# Patient Record
Sex: FEMALE | Race: OTHER | NOT HISPANIC OR LATINO
[De-identification: names, ages, dates, MRNs, and addresses within clinical notes are randomized per-mention and may not be internally consistent; named-entity substitution may affect disease eponyms.]

---

## 2019-07-12 ENCOUNTER — APPOINTMENT (OUTPATIENT)
Dept: NEUROSURGERY | Facility: CLINIC | Age: 64
End: 2019-07-12
Payer: COMMERCIAL

## 2019-07-12 VITALS
BODY MASS INDEX: 33.63 KG/M2 | HEIGHT: 64 IN | RESPIRATION RATE: 16 BRPM | WEIGHT: 197 LBS | SYSTOLIC BLOOD PRESSURE: 106 MMHG | OXYGEN SATURATION: 92 % | DIASTOLIC BLOOD PRESSURE: 76 MMHG | HEART RATE: 103 BPM

## 2019-07-12 PROBLEM — Z00.00 ENCOUNTER FOR PREVENTIVE HEALTH EXAMINATION: Status: ACTIVE | Noted: 2019-07-12

## 2019-07-12 PROCEDURE — 99204 OFFICE O/P NEW MOD 45 MIN: CPT

## 2019-07-22 ENCOUNTER — FORM ENCOUNTER (OUTPATIENT)
Age: 64
End: 2019-07-22

## 2019-07-23 ENCOUNTER — APPOINTMENT (OUTPATIENT)
Dept: CT IMAGING | Facility: HOSPITAL | Age: 64
End: 2019-07-23
Payer: COMMERCIAL

## 2019-07-23 ENCOUNTER — OUTPATIENT (OUTPATIENT)
Dept: OUTPATIENT SERVICES | Facility: HOSPITAL | Age: 64
LOS: 1 days | End: 2019-07-23
Payer: COMMERCIAL

## 2019-07-23 PROCEDURE — 72131 CT LUMBAR SPINE W/O DYE: CPT | Mod: 26

## 2019-07-23 PROCEDURE — 72110 X-RAY EXAM L-2 SPINE 4/>VWS: CPT

## 2019-07-23 PROCEDURE — 72131 CT LUMBAR SPINE W/O DYE: CPT

## 2019-07-23 PROCEDURE — 72110 X-RAY EXAM L-2 SPINE 4/>VWS: CPT | Mod: 26

## 2019-08-30 ENCOUNTER — APPOINTMENT (OUTPATIENT)
Dept: NEUROSURGERY | Facility: CLINIC | Age: 64
End: 2019-08-30
Payer: COMMERCIAL

## 2019-08-30 VITALS
BODY MASS INDEX: 33.63 KG/M2 | SYSTOLIC BLOOD PRESSURE: 122 MMHG | HEART RATE: 104 BPM | WEIGHT: 197 LBS | OXYGEN SATURATION: 94 % | HEIGHT: 64 IN | DIASTOLIC BLOOD PRESSURE: 83 MMHG | RESPIRATION RATE: 18 BRPM

## 2019-08-30 PROCEDURE — 99214 OFFICE O/P EST MOD 30 MIN: CPT

## 2019-08-30 NOTE — RESULTS/DATA
[FreeTextEntry1] : Patient Name: SONIDO WEBB   Report Date: 23-Jul-2019 13:59.00 \par Patient ID: [id number] (00), [id number] (EPI)  Accession No.: [id number] \par Patient Birth Date: 1955  Report Status: F \par Referring Physician: [id number] PRITI HORN   Reason For Study: assess dynamic instability  \par \par \par \par \par \par \par EXAM: CT LUMBAR SPINE \par \par PROCEDURE DATE: 07/23/2019 \par \par \par \par INTERPRETATION: INDICATIONS: Grade II spondylolisthesis. Back pain. \par \par TECHNIQUE: Serial axial images were obtained using multislice helical \par technique through the lumbar spine followed by multiplanar 3D reformations. \par No contrast was given. There are no prior studies. \par \par COMPARISON: none. \par \par FINDINGS: \par \par There is degenerative anterolisthesis of L5 with respect to S1 on the order \par of 8 mm. There are bilateral pars defects at the L5 level consistent with \par spondylolysis. On the coronal reformations, there is curvature in the lumbar \par spine, convex to the left. \par \par Multilevel degenerative osteoarthritis is present. Findings include marginal \par endplate osteophytes, particularly anteriorly, facet arthropathy, and \par ligamentum flavum thickening. Multilevel degenerative disc disease is \par present. Findings include loss of disc space height and endplate sclerosis. \par There is marked discogenic sclerosis at the L5-S1 level as well as vacuum \par phenomenon. \par \par L1-L2 LEVEL: No disc protrusion, foraminal narrowing, or central canal \par stenosis is present. \par L2-L3 LEVEL: Diffuse disc bulge is present. There is mild foraminal \par narrowing bilaterally. No central canal stenosis is present. \par L3-L4 LEVEL: Diffuse disc bulge is present eccentric to the right. There is \par foraminal narrowing, right more than left. No central canal stenosis is \par present. \par L4-L5 LEVEL: Diffuse disc bulge is present and may be a small central disc \par protrusion. There is mild foraminal narrowing bilaterally. There is no \par central canal stenosis. \par  L5-S1 LEVEL: The posterior disc space compartment is unroofed. There is a \par diffuse disc bulge. There is foraminal narrowing bilaterally. The \par anterolisthesis at this level contributes to mild central canal stenosis. \par \par MISCELLANEOUS: Atherosclerotic calcifications are seen within the abdominal \par aorta and iliac arteries bilaterally. There are calcified fibroids noted \par within the uterus. \par \par IMPRESSION: \par \par 1. 8 mm anterolisthesis of L5 with respect to S1 with bilateral pars defects \par at L5 level and mild central canal stenosis related to the anterolisthesis. \par 2. Diffuse disc bulges L2-L3 level through to the L5-S1 level inclusive with \par possible small central disc protrusion L4-L5 level. \par 3. Multilevel foraminal narrowing related to facet arthropathy, as above. \par 4. Mild levocurvature. \par \par \par \par \par \par Thank you for the opportunity to participate in the care of this patient. \par \par \par \par KENDRICK BARRETT M.D., ATTENDING RADIOLOGIST \par This document has been electronically signed. Jul 23 2019 1:59PM \par \par Patient Name: SONIDO WEBB   Report Date: 23-Jul-2019 15:26.00 \par Patient ID: [id number] ([facility]), [id number] (EPI)  Accession No.: [id number] \par Patient Birth Date: 1955  Report Status: F \par Referring Physician: [id number] PRITI HORN   Reason For Study: assess dynamic instability  \par \par \par \par \par \par \par EXAM: XR SPINE-LS-MIN 4 VIEWS \par \par PROCEDURE DATE: 07/23/2019 \par \par \par \par INTERPRETATION: INDICATIONS: L5 pars defects with spondylolisthesis, \par evaluate for dynamic instability \par \par TECHNIQUE: 4 views. AP, lateral, flexion, and extension radiographs of the \par lumbar spine. \par \par COMPARISON EXAMINATION: CT lumbar spine 7/23/2019. \par \par FINDINGS: \par \par ALIGNMENT: There is anterolisthesis of L5 with respect to S1 approximately 9 \par mm which increases to 13 mm on the extension view and on the flexion view \par which suggests translational motion at this level. The pars defects \par bilaterally are better seen on the recent CT scan of the lumbar spine that \par are present. \par \par OSSEOUS STRUCTURES: There are no compression deformities or acute fractures \par of the vertebral bodies. There is facet arthropathy of the lower lumbar \par spine. \par INTERVERTEBRAL DISCS: There is loss of intervertebral disc height, mild at \par the L3-L4 and L4-L5 levels and greatest at the L5-S1. \par SOFT TISSUES: There is no soft tissue edema. \par MISCELLANEOUS: A right hip prosthesis is partially visualized. \par \par IMPRESSION: \par \par 1. Anterolisthesis of L5 and S1 which varies on the flexion, neutral, and \par extension views, as above, indicative of translational motion. \par 2. Bilateral pars defects better delineated on recent CT scan of the lumbar \par spine from 7/23/2019. \par 3. Multilevel degenerative disease and osteoarthritis. \par \par \par \par \par \par Thank you for the opportunity to participate in the care of this patient. \par \par JONNA CASPER M.D., RADIOLOGY RESIDENT \par This document has been electronically signed. \par KENDRICK BARRETT M.D., ATTENDING RADIOLOGIST \par This document has been electronically signed. Jul 23 2019 3:26PM \par \par \par \par  \par \par \par  \par

## 2019-09-05 NOTE — REVIEW OF SYSTEMS
[Abnormal Sensation] : an abnormal sensation [Limping] : limping [As Noted in HPI] : as noted in HPI [Negative] : Respiratory [Abdominal Pain] : no abdominal pain [Diarrhea] : no diarrhea [Vomiting] : no vomiting [Incontinence] : no incontinence [Dysuria] : no dysuria [de-identified] : Pain limiting ambulation

## 2019-09-05 NOTE — ASSESSMENT
[FreeTextEntry1] : Mrs. Villafana suffers from chronic back pain limiting her activities and quality of life.  Imaging demonstrates a grade 2 spondylolisthesis of L5 on S1 with bilateral pars defects at L5.  We recommend an L5/S1 TLIF to decompress the lumbar spine, provide stability and prevent further damage.  After discussion of the risks and benefits of this surgical procedure, she wishes to proceed. \par \par 1)  Schedule L5/S1 TLIF 9/27/19 at [facility]\par Nares swabbed today 8/30/19 at [facility]\par Chlorhexadine wipes w/instructions provided 8/30/19 at [facility]\par PST packet provided\par CARYN Hawkins\par [phone]

## 2019-09-05 NOTE — DATA REVIEWED
[de-identified] : [facility] 7/23/19 Xrays Lumbar A/P Lat, FLEX/EXT - PACS [de-identified] : [facility] 7/23/19 CT Lumbar - PACS

## 2019-09-05 NOTE — PHYSICAL EXAM
[General Appearance - Alert] : alert [General Appearance - In No Acute Distress] : in no acute distress [General Appearance - Well Nourished] : well nourished [General Appearance - Well Developed] : well developed [Oriented To Time, Place, And Person] : oriented to person, place, and time [Impaired Insight] : insight and judgment were intact [Affect] : the affect was normal [Mood] : the mood was normal [Cranial Nerves Optic (II)] : visual acuity intact bilaterally,  pupils equal round and reactive to light [Cranial Nerves Oculomotor (III)] : extraocular motion intact [Cranial Nerves Trigeminal (V)] : facial sensation intact symmetrically [Cranial Nerves Facial (VII)] : face symmetrical [Cranial Nerves Vestibulocochlear (VIII)] : hearing was intact bilaterally [Cranial Nerves Glossopharyngeal (IX)] : tongue and palate midline [Cranial Nerves Accessory (XI - Cranial And Spinal)] : head turning and shoulder shrug symmetric [Cranial Nerves Hypoglossal (XII)] : there was no tongue deviation with protrusion [Motor Strength] : muscle strength was normal in all four extremities [Sensation Tactile Decrease] : light touch was intact [2+] : Patella left 2+ [Normal] : normal [Able to toe walk] : the patient was able to toe walk [Able to heel walk] : the patient was able to heel walk [Intact] : all reflexes within normal limits bilaterally [PERRL With Normal Accommodation] : pupils were equal in size, round, reactive to light, with normal accommodation [Hearing Threshold Finger Rub Not [place]] : hearing was normal [Neck Appearance] : the appearance of the neck was normal [] : no respiratory distress [Respiration, Rhythm And Depth] : normal respiratory rhythm and effort [Full Pulse] : the pedal pulses are present [Edema] : there was no peripheral edema [Abnormal Walk] : normal gait [Involuntary Movements] : no involuntary movements were seen [Motor Tone] : muscle strength and tone were normal [Skin Color & Pigmentation] : normal skin color and pigmentation [Romberg's Sign] : Romberg's sign was negtive [Tremor] : no tremor present [Limited Balance] : balance was intact [Straight-Leg Raise Test - Left] : straight leg raise of the left leg was negative [Straight-Leg Raise Test - Right] : straight leg raise  of the right leg was negative

## 2019-09-05 NOTE — HISTORY OF PRESENT ILLNESS
[FreeTextEntry1] : Mrs. Villafana is a 63 yo woman with PMH of smoking (40 pack yrs), R THR, depression, back pain with onset of 7 years, and perineal pain.  She arrives for imaging review and discussion r/b pain management Dr. Dowell.  \par \par Pain is midline Low back \par now rated 5/10, range 4/10 to 8/10\par Aggravated with standing more than 10 minutes, ambulating a few blocks\par Relieved with supine, or sitting in recliner\par \par Perineal pain is severe and most bothersome\par now rated 7/10, range 4/10 to 10/10\par Pain is constant\par No relief\par \par Pain management has included nerve blocks (perineal) with only 1 week relief\par Trigger point injections to Lumbar area with no relief\par \par PCP:  Parker Hawkins\par [phone]

## 2019-09-06 ENCOUNTER — OUTPATIENT (OUTPATIENT)
Dept: OUTPATIENT SERVICES | Facility: HOSPITAL | Age: 64
LOS: 1 days | End: 2019-09-06
Payer: COMMERCIAL

## 2019-09-06 DIAGNOSIS — Z22.321 CARRIER OR SUSPECTED CARRIER OF METHICILLIN SUSCEPTIBLE STAPHYLOCOCCUS AUREUS: ICD-10-CM

## 2019-09-06 DIAGNOSIS — M54.5 LOW BACK PAIN: ICD-10-CM

## 2019-09-06 PROCEDURE — 87641 MR-STAPH DNA AMP PROBE: CPT

## 2019-09-07 LAB
MRSA PCR RESULT.: SIGNIFICANT CHANGE UP
S AUREUS DNA NOSE QL NAA+PROBE: SIGNIFICANT CHANGE UP

## 2019-09-26 RX ORDER — INFLUENZA VIRUS VACCINE 15; 15; 15; 15 UG/.5ML; UG/.5ML; UG/.5ML; UG/.5ML
0.5 SUSPENSION INTRAMUSCULAR ONCE
Refills: 0 | Status: DISCONTINUED | OUTPATIENT
Start: 2019-09-27 | End: 2019-10-01

## 2019-09-27 ENCOUNTER — INPATIENT (INPATIENT)
Facility: HOSPITAL | Age: 64
LOS: 3 days | Discharge: HOME | DRG: 454 | End: 2019-10-01
Attending: STUDENT IN AN ORGANIZED HEALTH CARE EDUCATION/TRAINING PROGRAM | Admitting: STUDENT IN AN ORGANIZED HEALTH CARE EDUCATION/TRAINING PROGRAM
Payer: COMMERCIAL

## 2019-09-27 ENCOUNTER — APPOINTMENT (OUTPATIENT)
Dept: NEUROSURGERY | Facility: HOSPITAL | Age: 64
End: 2019-09-27

## 2019-09-27 VITALS
WEIGHT: 208.34 LBS | DIASTOLIC BLOOD PRESSURE: 77 MMHG | TEMPERATURE: 98 F | HEART RATE: 82 BPM | RESPIRATION RATE: 18 BRPM | HEIGHT: 64 IN | OXYGEN SATURATION: 95 % | SYSTOLIC BLOOD PRESSURE: 114 MMHG

## 2019-09-27 DIAGNOSIS — Z41.9 ENCOUNTER FOR PROCEDURE FOR PURPOSES OTHER THAN REMEDYING HEALTH STATE, UNSPECIFIED: Chronic | ICD-10-CM

## 2019-09-27 DIAGNOSIS — F17.210 NICOTINE DEPENDENCE, CIGARETTES, UNCOMPLICATED: ICD-10-CM

## 2019-09-27 DIAGNOSIS — R06.03 ACUTE RESPIRATORY DISTRESS: ICD-10-CM

## 2019-09-27 DIAGNOSIS — M54.17 RADICULOPATHY, LUMBOSACRAL REGION: ICD-10-CM

## 2019-09-27 DIAGNOSIS — Z96.641 PRESENCE OF RIGHT ARTIFICIAL HIP JOINT: ICD-10-CM

## 2019-09-27 DIAGNOSIS — Z96.641 PRESENCE OF RIGHT ARTIFICIAL HIP JOINT: Chronic | ICD-10-CM

## 2019-09-27 DIAGNOSIS — J44.9 CHRONIC OBSTRUCTIVE PULMONARY DISEASE, UNSPECIFIED: ICD-10-CM

## 2019-09-27 DIAGNOSIS — M48.07 SPINAL STENOSIS, LUMBOSACRAL REGION: ICD-10-CM

## 2019-09-27 DIAGNOSIS — M54.9 DORSALGIA, UNSPECIFIED: ICD-10-CM

## 2019-09-27 DIAGNOSIS — G89.29 OTHER CHRONIC PAIN: ICD-10-CM

## 2019-09-27 DIAGNOSIS — M43.17 SPONDYLOLISTHESIS, LUMBOSACRAL REGION: ICD-10-CM

## 2019-09-27 DIAGNOSIS — J98.11 ATELECTASIS: ICD-10-CM

## 2019-09-27 DIAGNOSIS — F32.9 MAJOR DEPRESSIVE DISORDER, SINGLE EPISODE, UNSPECIFIED: ICD-10-CM

## 2019-09-27 LAB
ANION GAP SERPL CALC-SCNC: 14 MMOL/L — SIGNIFICANT CHANGE UP (ref 5–17)
BASE EXCESS BLDA CALC-SCNC: -2.9 MMOL/L — LOW (ref -2–3)
BASOPHILS # BLD AUTO: 0.05 K/UL — SIGNIFICANT CHANGE UP (ref 0–0.2)
BASOPHILS NFR BLD AUTO: 0.3 % — SIGNIFICANT CHANGE UP (ref 0–2)
BUN SERPL-MCNC: 8 MG/DL — SIGNIFICANT CHANGE UP (ref 7–23)
CA-I BLDA-SCNC: 1.06 MMOL/L — LOW (ref 1.12–1.3)
CALCIUM SERPL-MCNC: 8.4 MG/DL — SIGNIFICANT CHANGE UP (ref 8.4–10.5)
CHLORIDE SERPL-SCNC: 104 MMOL/L — SIGNIFICANT CHANGE UP (ref 96–108)
CO2 SERPL-SCNC: 23 MMOL/L — SIGNIFICANT CHANGE UP (ref 22–31)
COHGB MFR BLDA: 1.5 % — SIGNIFICANT CHANGE UP
CREAT SERPL-MCNC: 0.66 MG/DL — SIGNIFICANT CHANGE UP (ref 0.5–1.3)
EOSINOPHIL # BLD AUTO: 0.01 K/UL — SIGNIFICANT CHANGE UP (ref 0–0.5)
EOSINOPHIL NFR BLD AUTO: 0.1 % — SIGNIFICANT CHANGE UP (ref 0–6)
GLUCOSE BLDC GLUCOMTR-MCNC: 125 MG/DL — HIGH (ref 70–99)
GLUCOSE SERPL-MCNC: 173 MG/DL — HIGH (ref 70–99)
HCO3 BLDA-SCNC: 24 MMOL/L — SIGNIFICANT CHANGE UP (ref 21–28)
HCT VFR BLD CALC: 38.1 % — SIGNIFICANT CHANGE UP (ref 34.5–45)
HGB BLD-MCNC: 12 G/DL — SIGNIFICANT CHANGE UP (ref 11.5–15.5)
HGB BLDA-MCNC: 13 G/DL — SIGNIFICANT CHANGE UP (ref 11.5–15.5)
IMM GRANULOCYTES NFR BLD AUTO: 0.9 % — SIGNIFICANT CHANGE UP (ref 0–1.5)
LYMPHOCYTES # BLD AUTO: 0.72 K/UL — LOW (ref 1–3.3)
LYMPHOCYTES # BLD AUTO: 4.8 % — LOW (ref 13–44)
MCHC RBC-ENTMCNC: 28.2 PG — SIGNIFICANT CHANGE UP (ref 27–34)
MCHC RBC-ENTMCNC: 31.5 GM/DL — LOW (ref 32–36)
MCV RBC AUTO: 89.4 FL — SIGNIFICANT CHANGE UP (ref 80–100)
METHGB MFR BLDA: 0.7 % — SIGNIFICANT CHANGE UP
MONOCYTES # BLD AUTO: 0.21 K/UL — SIGNIFICANT CHANGE UP (ref 0–0.9)
MONOCYTES NFR BLD AUTO: 1.4 % — LOW (ref 2–14)
NEUTROPHILS # BLD AUTO: 13.97 K/UL — HIGH (ref 1.8–7.4)
NEUTROPHILS NFR BLD AUTO: 92.5 % — HIGH (ref 43–77)
NRBC # BLD: 0 /100 WBCS — SIGNIFICANT CHANGE UP (ref 0–0)
O2 CT VFR BLDA CALC: 18.7 ML/DL — SIGNIFICANT CHANGE UP (ref 15–23)
OXYHGB MFR BLDA: 97 % — SIGNIFICANT CHANGE UP (ref 94–100)
PCO2 BLDA: 47 MMHG — HIGH (ref 32–45)
PH BLDA: 7.32 — LOW (ref 7.35–7.45)
PLATELET # BLD AUTO: 251 K/UL — SIGNIFICANT CHANGE UP (ref 150–400)
PO2 BLDA: 358 MMHG — HIGH (ref 83–108)
POTASSIUM BLDA-SCNC: 4 MMOL/L — SIGNIFICANT CHANGE UP (ref 3.5–4.9)
POTASSIUM SERPL-MCNC: 4.5 MMOL/L — SIGNIFICANT CHANGE UP (ref 3.5–5.3)
POTASSIUM SERPL-SCNC: 4.5 MMOL/L — SIGNIFICANT CHANGE UP (ref 3.5–5.3)
RBC # BLD: 4.26 M/UL — SIGNIFICANT CHANGE UP (ref 3.8–5.2)
RBC # FLD: 14.7 % — HIGH (ref 10.3–14.5)
SAO2 % BLDA: 100 % — SIGNIFICANT CHANGE UP (ref 95–100)
SODIUM BLDA-SCNC: 138 MMOL/L — SIGNIFICANT CHANGE UP (ref 138–146)
SODIUM SERPL-SCNC: 141 MMOL/L — SIGNIFICANT CHANGE UP (ref 135–145)
WBC # BLD: 15.1 K/UL — HIGH (ref 3.8–10.5)
WBC # FLD AUTO: 15.1 K/UL — HIGH (ref 3.8–10.5)

## 2019-09-27 PROCEDURE — 63048 LAM FACETEC &FORAMOT EA ADDL: CPT | Mod: 62,59

## 2019-09-27 PROCEDURE — 22633 ARTHRD CMBN 1NTRSPC LUMBAR: CPT | Mod: 62

## 2019-09-27 PROCEDURE — 61783 SCAN PROC SPINAL: CPT

## 2019-09-27 PROCEDURE — 22853 INSJ BIOMECHANICAL DEVICE: CPT

## 2019-09-27 PROCEDURE — 63047 LAM FACETEC & FORAMOT LUMBAR: CPT | Mod: 62,59

## 2019-09-27 PROCEDURE — 61783 SCAN PROC SPINAL: CPT | Mod: 80,59

## 2019-09-27 PROCEDURE — 22840 INSERT SPINE FIXATION DEVICE: CPT

## 2019-09-27 PROCEDURE — 22840 INSERT SPINE FIXATION DEVICE: CPT | Mod: 80

## 2019-09-27 PROCEDURE — 22853 INSJ BIOMECHANICAL DEVICE: CPT | Mod: 80

## 2019-09-27 RX ORDER — ONDANSETRON 8 MG/1
4 TABLET, FILM COATED ORAL EVERY 6 HOURS
Refills: 0 | Status: DISCONTINUED | OUTPATIENT
Start: 2019-09-27 | End: 2019-09-27

## 2019-09-27 RX ORDER — HYDROMORPHONE HYDROCHLORIDE 2 MG/ML
30 INJECTION INTRAMUSCULAR; INTRAVENOUS; SUBCUTANEOUS
Refills: 0 | Status: DISCONTINUED | OUTPATIENT
Start: 2019-09-27 | End: 2019-09-28

## 2019-09-27 RX ORDER — POVIDONE-IODINE 5 %
1 AEROSOL (ML) TOPICAL ONCE
Refills: 0 | Status: DISCONTINUED | OUTPATIENT
Start: 2019-09-27 | End: 2019-09-27

## 2019-09-27 RX ORDER — CEFAZOLIN SODIUM 1 G
2000 VIAL (EA) INJECTION EVERY 8 HOURS
Refills: 0 | Status: COMPLETED | OUTPATIENT
Start: 2019-09-27 | End: 2019-09-28

## 2019-09-27 RX ORDER — BUPIVACAINE 13.3 MG/ML
20 INJECTION, SUSPENSION, LIPOSOMAL INFILTRATION ONCE
Refills: 0 | Status: DISCONTINUED | OUTPATIENT
Start: 2019-09-27 | End: 2019-10-01

## 2019-09-27 RX ORDER — DIAZEPAM 5 MG
5 TABLET ORAL EVERY 8 HOURS
Refills: 0 | Status: DISCONTINUED | OUTPATIENT
Start: 2019-09-27 | End: 2019-10-01

## 2019-09-27 RX ORDER — CEFAZOLIN SODIUM 1 G
2000 VIAL (EA) INJECTION EVERY 8 HOURS
Refills: 0 | Status: DISCONTINUED | OUTPATIENT
Start: 2019-09-27 | End: 2019-09-27

## 2019-09-27 RX ORDER — SENNA PLUS 8.6 MG/1
2 TABLET ORAL AT BEDTIME
Refills: 0 | Status: DISCONTINUED | OUTPATIENT
Start: 2019-09-27 | End: 2019-10-01

## 2019-09-27 RX ORDER — GABAPENTIN 400 MG/1
400 CAPSULE ORAL
Refills: 0 | Status: DISCONTINUED | OUTPATIENT
Start: 2019-09-27 | End: 2019-10-01

## 2019-09-27 RX ORDER — SODIUM CHLORIDE 9 MG/ML
1000 INJECTION, SOLUTION INTRAVENOUS
Refills: 0 | Status: DISCONTINUED | OUTPATIENT
Start: 2019-09-27 | End: 2019-09-28

## 2019-09-27 RX ORDER — DOCUSATE SODIUM 100 MG
100 CAPSULE ORAL THREE TIMES A DAY
Refills: 0 | Status: DISCONTINUED | OUTPATIENT
Start: 2019-09-27 | End: 2019-10-01

## 2019-09-27 RX ORDER — ONDANSETRON 8 MG/1
4 TABLET, FILM COATED ORAL EVERY 6 HOURS
Refills: 0 | Status: DISCONTINUED | OUTPATIENT
Start: 2019-09-27 | End: 2019-10-01

## 2019-09-27 RX ORDER — GABAPENTIN 400 MG/1
0 CAPSULE ORAL
Qty: 0 | Refills: 0 | DISCHARGE

## 2019-09-27 RX ORDER — NALOXONE HYDROCHLORIDE 4 MG/.1ML
0.1 SPRAY NASAL
Refills: 0 | Status: DISCONTINUED | OUTPATIENT
Start: 2019-09-27 | End: 2019-10-01

## 2019-09-27 RX ORDER — NICOTINE POLACRILEX 2 MG
1 GUM BUCCAL DAILY
Refills: 0 | Status: DISCONTINUED | OUTPATIENT
Start: 2019-09-27 | End: 2019-10-01

## 2019-09-27 RX ORDER — DEXAMETHASONE 0.5 MG/5ML
4 ELIXIR ORAL EVERY 6 HOURS
Refills: 0 | Status: COMPLETED | OUTPATIENT
Start: 2019-09-27 | End: 2019-09-28

## 2019-09-27 RX ORDER — HYDROMORPHONE HYDROCHLORIDE 2 MG/ML
0.5 INJECTION INTRAMUSCULAR; INTRAVENOUS; SUBCUTANEOUS
Refills: 0 | Status: DISCONTINUED | OUTPATIENT
Start: 2019-09-27 | End: 2019-09-28

## 2019-09-27 RX ORDER — ACETAMINOPHEN 500 MG
1000 TABLET ORAL ONCE
Refills: 0 | Status: COMPLETED | OUTPATIENT
Start: 2019-09-27 | End: 2019-09-27

## 2019-09-27 RX ADMIN — Medication 2000 MILLIGRAM(S): at 21:12

## 2019-09-27 RX ADMIN — HYDROMORPHONE HYDROCHLORIDE 0.5 MILLIGRAM(S): 2 INJECTION INTRAMUSCULAR; INTRAVENOUS; SUBCUTANEOUS at 15:34

## 2019-09-27 RX ADMIN — Medication 4 MILLIGRAM(S): at 17:52

## 2019-09-27 RX ADMIN — HYDROMORPHONE HYDROCHLORIDE 0.5 MILLIGRAM(S): 2 INJECTION INTRAMUSCULAR; INTRAVENOUS; SUBCUTANEOUS at 16:08

## 2019-09-27 RX ADMIN — HYDROMORPHONE HYDROCHLORIDE 30 MILLILITER(S): 2 INJECTION INTRAMUSCULAR; INTRAVENOUS; SUBCUTANEOUS at 16:38

## 2019-09-27 RX ADMIN — SENNA PLUS 2 TABLET(S): 8.6 TABLET ORAL at 22:24

## 2019-09-27 RX ADMIN — Medication 400 MILLIGRAM(S): at 22:24

## 2019-09-27 RX ADMIN — Medication 1000 MILLIGRAM(S): at 22:39

## 2019-09-27 RX ADMIN — Medication 100 MILLIGRAM(S): at 22:24

## 2019-09-27 RX ADMIN — GABAPENTIN 400 MILLIGRAM(S): 400 CAPSULE ORAL at 17:52

## 2019-09-27 RX ADMIN — Medication 5 MILLIGRAM(S): at 16:38

## 2019-09-27 NOTE — CHART NOTE - NSCHARTNOTEFT_GEN_A_CORE
Neurosurgical Indications for Screening Dopplers on Admission:       1) Known hypercoagulation disorder (h/o VTE, thrombophilia, HIT, etc.)   2) Admitted from prolonged stay from another institution (straight forward ER transfers not included)  3) Presenting with significant leg immobility   4) Presenting with signs and symptoms of VTE?    5) With significant critical illness, Including "found down" for unknown period of time in HPI  6) With significant neurotrauma (TBI, SCI / TLS spine fractures)   7) Who are comatose   8) With known malignancy (e.g. glioblastoma multiforme, meningioma, etc.). Excludes IA chemo 23hr observation stays  9) On hemodialysis   10) Who have received platelet transfusion or antithrombotic reversal agents recently   11) Who have had recent major orthopedic surgery        none of the above apply     Screening dopplers indicated?   Y _   N _  no   DVT Prophylaxis:  _ x_ SCD's   _ chemoprophylaxis postop day 1

## 2019-09-27 NOTE — H&P PST ADULT - HISTORY OF PRESENT ILLNESS
Mrs. Villafana is a 65 yo woman with PMH of smoking (40 pack yrs), R THR, depression, back pain with onset of 7 years, and perineal pain. She arrives for imaging review and discussion r/b pain management Dr. Dowell.     Pain is midline Low back   now rated 5/10, range 4/10 to 8/10  Aggravated with standing more than 10 minutes, ambulating a few blocks  Relieved with supine, or sitting in recliner    Perineal pain is severe and most bothersome  now rated 7/10, range 4/10 to 10/10  Pain is constant  No relief    Pain management has included nerve blocks (perineal) with only 1 week relief  Trigger point injections to Lumbar area with no relief    PCP: Parker Hawkins [phone]     Physical Exam  Lumbar/sacral spine examination demonstrates. straight leg raise of the left leg was negative. straight leg raise of the right leg was negative.   Gait: normal the patient was able to toe walk. the patient was able to heel walk.   Motor Exam: all motor groups within normal limits of strength and tone bilaterally.   Sensory Exam: all sensory within normal limits bilaterally.   Deep Tendon Reflexes: all reflexes within normal limits bilaterally.

## 2019-09-27 NOTE — PACU DISCHARGE NOTE - COMMENTS
pt aao x3.  VSS.  lower back aquacell dsg c/d/i; hemovac to right back and left back to ss; telfa dsg to right lower back c/d/i.  pt denies c/o pain.  report given to RN on 8 wollman.  pt to go to [facility] via bed with transport and RN

## 2019-09-28 DIAGNOSIS — J96.90 RESPIRATORY FAILURE, UNSPECIFIED, UNSPECIFIED WHETHER WITH HYPOXIA OR HYPERCAPNIA: ICD-10-CM

## 2019-09-28 DIAGNOSIS — J98.11 ATELECTASIS: ICD-10-CM

## 2019-09-28 DIAGNOSIS — F17.200 NICOTINE DEPENDENCE, UNSPECIFIED, UNCOMPLICATED: ICD-10-CM

## 2019-09-28 LAB
ANION GAP SERPL CALC-SCNC: 8 MMOL/L — SIGNIFICANT CHANGE UP (ref 5–17)
BASOPHILS # BLD AUTO: 0.02 K/UL — SIGNIFICANT CHANGE UP (ref 0–0.2)
BASOPHILS NFR BLD AUTO: 0.1 % — SIGNIFICANT CHANGE UP (ref 0–2)
BUN SERPL-MCNC: 10 MG/DL — SIGNIFICANT CHANGE UP (ref 7–23)
CALCIUM SERPL-MCNC: 9.2 MG/DL — SIGNIFICANT CHANGE UP (ref 8.4–10.5)
CHLORIDE SERPL-SCNC: 101 MMOL/L — SIGNIFICANT CHANGE UP (ref 96–108)
CO2 SERPL-SCNC: 31 MMOL/L — SIGNIFICANT CHANGE UP (ref 22–31)
CREAT SERPL-MCNC: 0.65 MG/DL — SIGNIFICANT CHANGE UP (ref 0.5–1.3)
EOSINOPHIL # BLD AUTO: 0 K/UL — SIGNIFICANT CHANGE UP (ref 0–0.5)
EOSINOPHIL NFR BLD AUTO: 0 % — SIGNIFICANT CHANGE UP (ref 0–6)
GLUCOSE SERPL-MCNC: 147 MG/DL — HIGH (ref 70–99)
HCT VFR BLD CALC: 36.3 % — SIGNIFICANT CHANGE UP (ref 34.5–45)
HGB BLD-MCNC: 11.8 G/DL — SIGNIFICANT CHANGE UP (ref 11.5–15.5)
IMM GRANULOCYTES NFR BLD AUTO: 0.7 % — SIGNIFICANT CHANGE UP (ref 0–1.5)
LYMPHOCYTES # BLD AUTO: 0.95 K/UL — LOW (ref 1–3.3)
LYMPHOCYTES # BLD AUTO: 6.8 % — LOW (ref 13–44)
MAGNESIUM SERPL-MCNC: 2 MG/DL — SIGNIFICANT CHANGE UP (ref 1.6–2.6)
MCHC RBC-ENTMCNC: 28.7 PG — SIGNIFICANT CHANGE UP (ref 27–34)
MCHC RBC-ENTMCNC: 32.5 GM/DL — SIGNIFICANT CHANGE UP (ref 32–36)
MCV RBC AUTO: 88.3 FL — SIGNIFICANT CHANGE UP (ref 80–100)
MONOCYTES # BLD AUTO: 0.48 K/UL — SIGNIFICANT CHANGE UP (ref 0–0.9)
MONOCYTES NFR BLD AUTO: 3.4 % — SIGNIFICANT CHANGE UP (ref 2–14)
NEUTROPHILS # BLD AUTO: 12.38 K/UL — HIGH (ref 1.8–7.4)
NEUTROPHILS NFR BLD AUTO: 89 % — HIGH (ref 43–77)
NRBC # BLD: 0 /100 WBCS — SIGNIFICANT CHANGE UP (ref 0–0)
PHOSPHATE SERPL-MCNC: 3.7 MG/DL — SIGNIFICANT CHANGE UP (ref 2.5–4.5)
PLATELET # BLD AUTO: 246 K/UL — SIGNIFICANT CHANGE UP (ref 150–400)
POTASSIUM SERPL-MCNC: 4.6 MMOL/L — SIGNIFICANT CHANGE UP (ref 3.5–5.3)
POTASSIUM SERPL-SCNC: 4.6 MMOL/L — SIGNIFICANT CHANGE UP (ref 3.5–5.3)
RBC # BLD: 4.11 M/UL — SIGNIFICANT CHANGE UP (ref 3.8–5.2)
RBC # FLD: 14.8 % — HIGH (ref 10.3–14.5)
SODIUM SERPL-SCNC: 140 MMOL/L — SIGNIFICANT CHANGE UP (ref 135–145)
WBC # BLD: 13.93 K/UL — HIGH (ref 3.8–10.5)
WBC # FLD AUTO: 13.93 K/UL — HIGH (ref 3.8–10.5)

## 2019-09-28 PROCEDURE — 99221 1ST HOSP IP/OBS SF/LOW 40: CPT

## 2019-09-28 PROCEDURE — 71045 X-RAY EXAM CHEST 1 VIEW: CPT | Mod: 26

## 2019-09-28 PROCEDURE — 99223 1ST HOSP IP/OBS HIGH 75: CPT | Mod: GC

## 2019-09-28 PROCEDURE — 93010 ELECTROCARDIOGRAM REPORT: CPT

## 2019-09-28 RX ORDER — OXYCODONE AND ACETAMINOPHEN 5; 325 MG/1; MG/1
2 TABLET ORAL EVERY 4 HOURS
Refills: 0 | Status: DISCONTINUED | OUTPATIENT
Start: 2019-09-28 | End: 2019-10-01

## 2019-09-28 RX ORDER — PANTOPRAZOLE SODIUM 20 MG/1
40 TABLET, DELAYED RELEASE ORAL ONCE
Refills: 0 | Status: COMPLETED | OUTPATIENT
Start: 2019-09-28 | End: 2019-09-28

## 2019-09-28 RX ORDER — OXYCODONE AND ACETAMINOPHEN 5; 325 MG/1; MG/1
1 TABLET ORAL EVERY 4 HOURS
Refills: 0 | Status: DISCONTINUED | OUTPATIENT
Start: 2019-09-28 | End: 2019-10-01

## 2019-09-28 RX ORDER — ENOXAPARIN SODIUM 100 MG/ML
40 INJECTION SUBCUTANEOUS EVERY 24 HOURS
Refills: 0 | Status: DISCONTINUED | OUTPATIENT
Start: 2019-09-28 | End: 2019-10-01

## 2019-09-28 RX ADMIN — OXYCODONE AND ACETAMINOPHEN 2 TABLET(S): 5; 325 TABLET ORAL at 18:40

## 2019-09-28 RX ADMIN — Medication 100 MILLIGRAM(S): at 21:49

## 2019-09-28 RX ADMIN — GABAPENTIN 400 MILLIGRAM(S): 400 CAPSULE ORAL at 05:15

## 2019-09-28 RX ADMIN — GABAPENTIN 400 MILLIGRAM(S): 400 CAPSULE ORAL at 19:23

## 2019-09-28 RX ADMIN — Medication 4 MILLIGRAM(S): at 05:15

## 2019-09-28 RX ADMIN — Medication 5 MILLIGRAM(S): at 19:23

## 2019-09-28 RX ADMIN — Medication 4 MILLIGRAM(S): at 00:13

## 2019-09-28 RX ADMIN — OXYCODONE AND ACETAMINOPHEN 1 TABLET(S): 5; 325 TABLET ORAL at 22:56

## 2019-09-28 RX ADMIN — OXYCODONE AND ACETAMINOPHEN 2 TABLET(S): 5; 325 TABLET ORAL at 19:03

## 2019-09-28 RX ADMIN — ENOXAPARIN SODIUM 40 MILLIGRAM(S): 100 INJECTION SUBCUTANEOUS at 21:49

## 2019-09-28 RX ADMIN — OXYCODONE AND ACETAMINOPHEN 1 TABLET(S): 5; 325 TABLET ORAL at 21:56

## 2019-09-28 RX ADMIN — Medication 100 MILLIGRAM(S): at 05:15

## 2019-09-28 RX ADMIN — Medication 5 MILLIGRAM(S): at 07:35

## 2019-09-28 RX ADMIN — SENNA PLUS 2 TABLET(S): 8.6 TABLET ORAL at 21:49

## 2019-09-28 RX ADMIN — Medication 4 MILLIGRAM(S): at 12:04

## 2019-09-28 RX ADMIN — Medication 5 MILLIGRAM(S): at 00:13

## 2019-09-28 RX ADMIN — Medication 2000 MILLIGRAM(S): at 05:15

## 2019-09-28 NOTE — CONSULT NOTE ADULT - ATTENDING COMMENTS
Post operative hypoxemia most likely secondary to atelectasis. Current smoker so may have undiagnosed COPD but not in exacerbation at this time. Low suspicion for PE. Incentive spirometry and ambulation. Should get outpatient follow up for evaluation of possible COPD. Please call with questions.

## 2019-09-28 NOTE — CONSULT NOTE ADULT - ASSESSMENT
This is a 64 y.o female w/ PMH R THR, back pain for 7 years that failed conservative management, smoking for 40 years ( 1.p ppd, now cut down to 6-7 cigs daily) who is admitted for Rt L5-S1 TLIF (transforaminal lumbar interbody fusion) for spondylolisthesis. Medicine consulted for hypoxia to low 80's on RA.     - O2 sat on RA today was 86% which improved on 6L NC to 95%  - OP CXR 1 month ago with bibasilar atelectasis. Repeat CXR today with atelectasis but no infiltrates or consolidation.   - Believe patient has COPD given hx of smoking, cough and sputum with a component of atelectasis leading to hypoxia while hospitalized. Pt is currently not in any exacerbation and does not require any steroids or abx.   - Recommend OOTBC and ICS 10 times every hour  - Leukocytosis unlikely from PNA as cough is unchanged and patient with no fevers. Recommend RVP  - Wean off Os as tolerated  - Need OP Pulmonology appointment for PFT's This is a 64 y.o female w/ PMH R THR, back pain for 7 years that failed conservative management, smoking for 40 years ( 1.p ppd, now cut down to 6-7 cigs daily) who is admitted for Rt L5-S1 TLIF (transforaminal lumbar interbody fusion) for spondylolisthesis. Medicine consulted for hypoxia to low 80's on RA.     - O2 sat on RA today was 86% which improved on 6L NC to 95%. Please get ABG on Room Air   - OP CXR 1 month ago with bibasilar atelectasis. Repeat CXR today with atelectasis but no infiltrates or consolidation.   - Believe patient has COPD given hx of smoking, cough and sputum with a component of atelectasis leading to hypoxia while hospitalized. Pt is currently not in any exacerbation and does not require any steroids or abx.   - Recommend OOTBC and ICS 10 times every hour  - Leukocytosis unlikely from PNA as cough is unchanged and patient with no fevers. Recommend RVP  - Please obtain EKG   - Wean off Os as tolerated  - Recommend Pulm consult This is a 64 y.o female w/ PMH R THR, back pain for 7 years that failed conservative management, smoking for 40 years ( 1.p ppd, now cut down to 6-7 cigs daily) who is admitted for Rt L5-S1 TLIF (transforaminal lumbar interbody fusion) for spondylolisthesis. Medicine consulted for hypoxia to low 80's on RA.     - O2 sat on RA today was 86% which improved on 6L NC to 95%. Please get ABG on Room Air and consider CTA to r/o PE  - OP CXR 1 month ago with bibasilar atelectasis. Repeat CXR today with atelectasis but no infiltrates or consolidation.   - Believe patient has COPD given hx of smoking, cough and sputum with a component of atelectasis leading to hypoxia while hospitalized. Pt is currently not in any exacerbation and does not require any steroids or abx.   - Recommend OOTBC and ICS 10 times every hour  - Leukocytosis unlikely from PNA as cough is unchanged and patient with no fevers. Recommend RVP  - Please obtain EKG   - Wean off Os as tolerated  - Recommend Pulm consult

## 2019-09-28 NOTE — PHYSICAL THERAPY INITIAL EVALUATION ADULT - PERTINENT HX OF CURRENT PROBLEM, REHAB EVAL
Patient's history includes PMH of smoking (40 pack yrs), R THR, depression, back pain with onset of 7 years, and perineal pain. Pain management Dr. Dowell. Pain management has included nerve blocks (perineal) with only 1 week relief. Trigger point injections to Lumbar area with no relief. Now s/p POD# 1 S/P Rt L5-S1 TLIF for spondylolisthesis with back pain

## 2019-09-28 NOTE — CONSULT NOTE ADULT - ATTENDING COMMENTS
Patient seen and examined at bedside. Agree with the history, physical exam, assessment, and plan as outline above with the following addendum. Briefly patient is a 63 YO F h/o chronic back pain, s/p R THR, tobacco abuse admitted for R L5-S1 TLIF for spondylolisthesis. POD 1. Medicine consulted for hypoxia noted overnight to the 70s-80s. Pt denies similar episodes in the past. Pt reports a Patient seen and examined at bedside. Agree with the history, physical exam, assessment, and plan as outline above with the following addendum. Briefly patient is a 65 YO F h/o chronic back pain, s/p R THR, tobacco abuse (currently 0.25 PPD, previously 1-1.5 PPD x 40 years) admitted for R L5-S1 TLIF for spondylolisthesis. POD 1. Medicine consulted for hypoxia noted overnight to the 70s-80s. Pt denies similar episodes of hypoxia in the past or history of BOO. Pt reports a chronic productive cough with green sputum and occasional episodes of SOB prior to falling asleep. Can walk 6-7 blocks at which time she stops due to back pain. Denies fever, chills, CP, SOB, KNWOLES, orthopnea, PND, LE edema, abdominal pain, nausea, vomiting, melena, hematochezia. VS significant for hypoxia to 77 on RA, currently on 6 L NC with SPO2 92%. Physical exam per above. Labs and imaging reviewed.     # Hypoxia of unclear etiology  - DDx includes COPD vs atelectasis vs pulmonary embolism vs infection  - CXR shows discoid changes, no focal consolidation appreciated, thus PNA is less likey, can check RVP to r/o viral etiology  - Chronic cough with sputum is suggestive of emphysema/bronchiectasis consistent with COPD  - Recommend obtaining an ABG on RA to assess the A-A gradient  - Check CTA chest to r/o pulmonary embolism  - Check EKG, consider ECHO  - Please consult pulmonary  - C/w supplemental O2  - Emphasized the importance of incentive spirometer use

## 2019-09-28 NOTE — CONSULT NOTE ADULT - SUBJECTIVE AND OBJECTIVE BOX
CONSULT NOTE    This is a 64 y.o female w/ PMH R THR, back pain for7 years that failed consrevative management, smokign for 40 years ( 1.p ppd, now cut down to 6-7 cigs daily)  Pain management Dr. Dowell. Pain management has included nerve blocks (perineal) with only 1 week relief. Trigger point injections to Lumbar area with no relief            VITAL SIGNS:  Vital Signs Last 24 Hrs  T(C): 36.7 (28 Sep 2019 08:33), Max: 36.7 (28 Sep 2019 05:00)  T(F): 98 (28 Sep 2019 08:33), Max: 98 (28 Sep 2019 05:00)  HR: 104 (28 Sep 2019 12:30) (72 - 104)  BP: 100/64 (28 Sep 2019 12:30) (90/55 - 117/73)  BP(mean): 66 (28 Sep 2019 05:00) (56 - 88)  RR: 15 (28 Sep 2019 08:33) (10 - 23)  SpO2: 93% (28 Sep 2019 12:30) (77% - 100%)    09-27-19 @ 07:01  -  09-28-19 @ 07:00  --------------------------------------------------------  IN: 630 mL / OUT: 1852 mL / NET: -1222 mL        PHYSICAL EXAM:    General: WDWN  HEENT: NC/AT; PERRL, anicteric sclera; MMM  Neck: supple  Cardiovascular: +S1/S2; RRR  Respiratory: CTA B/L; no W/R/R  Gastrointestinal: soft, NT/ND; +BSx4  Extremities: WWP; no edema, clubbing or cyanosis  Vascular: 2+ radial, DP/PT pulses B/L  Neurological: AAOx3; no focal deficits    MEDICATIONS:  MEDICATIONS  (STANDING):  BUpivacaine liposome 1.3% Injectable (no eMAR) 20 milliLiter(s) Local Injection once  diazepam    Tablet 5 milliGRAM(s) Oral every 8 hours  docusate sodium 100 milliGRAM(s) Oral three times a day  enoxaparin Injectable 40 milliGRAM(s) SubCutaneous every 24 hours  gabapentin 400 milliGRAM(s) Oral two times a day  influenza   Vaccine 0.5 milliLiter(s) IntraMuscular once  nicotine -   7 mG/24Hr(s) Patch 1 patch Transdermal daily  senna 2 Tablet(s) Oral at bedtime    MEDICATIONS  (PRN):  naloxone Injectable 0.1 milliGRAM(s) IV Push every 3 minutes PRN For ANY of the following changes in patient status:  A. RR LESS THAN 10 breaths per minute, B. Oxygen saturation LESS THAN 90%, C. Sedation score of 6  ondansetron Injectable 4 milliGRAM(s) IV Push every 6 hours PRN Nausea  oxyCODONE    5 mG/acetaminophen 325 mG 1 Tablet(s) Oral every 4 hours PRN Moderate Pain (4 - 6)  oxyCODONE    5 mG/acetaminophen 325 mG 2 Tablet(s) Oral every 4 hours PRN Severe Pain (7 - 10)      ALLERGIES:  Allergies    No Known Allergies    Intolerances        LABS:                        11.8   13.93 )-----------( 246      ( 28 Sep 2019 07:26 )             36.3     09-28    140  |  101  |  10  ----------------------------<  147<H>  4.6   |  31  |  0.65    Ca    9.2      28 Sep 2019 07:26  Phos  3.7     09-28  Mg     2.0     09-28        CAPILLARY BLOOD GLUCOSE      POCT Blood Glucose.: 125 mg/dL (27 Sep 2019 11:48)        RADIOLOGY & ADDITIONAL TESTS: Reviewed.    ASSESSMENT:    PLAN: CONSULT NOTE    This is a 64 y.o female w/ PMH R THR, back pain for 7 years that failed conservative management, smoking for 40 years ( 1.p ppd, now cut down to 6-7 cigs daily) who is admitted for Rt L5-S1 TLIF (transforaminal lumbar interbody fusion) for spondylolisthesis. She is now POD # 1      VITAL SIGNS:  Vital Signs Last 24 Hrs  T(C): 36.7 (28 Sep 2019 08:33), Max: 36.7 (28 Sep 2019 05:00)  T(F): 98 (28 Sep 2019 08:33), Max: 98 (28 Sep 2019 05:00)  HR: 104 (28 Sep 2019 12:30) (72 - 104)  BP: 100/64 (28 Sep 2019 12:30) (90/55 - 117/73)  BP(mean): 66 (28 Sep 2019 05:00) (56 - 88)  RR: 15 (28 Sep 2019 08:33) (10 - 23)  SpO2: 93% (28 Sep 2019 12:30) (77% - 100%)    09-27-19 @ 07:01  -  09-28-19 @ 07:00  --------------------------------------------------------  IN: 630 mL / OUT: 1852 mL / NET: -1222 mL        PHYSICAL EXAM:    General: WDWN  HEENT: NC/AT; PERRL, anicteric sclera; MMM  Neck: supple  Cardiovascular: +S1/S2; RRR  Respiratory: CTA B/L; no W/R/R  Gastrointestinal: soft, NT/ND; +BSx4  Extremities: WWP; no edema, clubbing or cyanosis  Vascular: 2+ radial, DP/PT pulses B/L  Neurological: AAOx3; no focal deficits    MEDICATIONS:  MEDICATIONS  (STANDING):  BUpivacaine liposome 1.3% Injectable (no eMAR) 20 milliLiter(s) Local Injection once  diazepam    Tablet 5 milliGRAM(s) Oral every 8 hours  docusate sodium 100 milliGRAM(s) Oral three times a day  enoxaparin Injectable 40 milliGRAM(s) SubCutaneous every 24 hours  gabapentin 400 milliGRAM(s) Oral two times a day  influenza   Vaccine 0.5 milliLiter(s) IntraMuscular once  nicotine -   7 mG/24Hr(s) Patch 1 patch Transdermal daily  senna 2 Tablet(s) Oral at bedtime    MEDICATIONS  (PRN):  naloxone Injectable 0.1 milliGRAM(s) IV Push every 3 minutes PRN For ANY of the following changes in patient status:  A. RR LESS THAN 10 breaths per minute, B. Oxygen saturation LESS THAN 90%, C. Sedation score of 6  ondansetron Injectable 4 milliGRAM(s) IV Push every 6 hours PRN Nausea  oxyCODONE    5 mG/acetaminophen 325 mG 1 Tablet(s) Oral every 4 hours PRN Moderate Pain (4 - 6)  oxyCODONE    5 mG/acetaminophen 325 mG 2 Tablet(s) Oral every 4 hours PRN Severe Pain (7 - 10)      ALLERGIES:  Allergies    No Known Allergies    Intolerances        LABS:                        11.8   13.93 )-----------( 246      ( 28 Sep 2019 07:26 )             36.3     09-28    140  |  101  |  10  ----------------------------<  147<H>  4.6   |  31  |  0.65    Ca    9.2      28 Sep 2019 07:26  Phos  3.7     09-28  Mg     2.0     09-28        CAPILLARY BLOOD GLUCOSE      POCT Blood Glucose.: 125 mg/dL (27 Sep 2019 11:48)        RADIOLOGY & ADDITIONAL TESTS: Reviewed.    ASSESSMENT:    PLAN: CONSULT NOTE    This is a 64 y.o female w/ PMH R THR, back pain for 7 years that failed conservative management, smoking for 40 years ( 1.p ppd, now cut down to 6-7 cigs daily) who is admitted for Rt L5-S1 TLIF (transforaminal lumbar interbody fusion) for spondylolisthesis. She is now POD # 1. Medicine was consulted for hypoxia to 80's? (unclear since not documented) after procedure requiring oxygen at 6L. Per pt, she has never been diagnosed with COPD but has been smoking for 40 years. Pt denied a cough when asked but was actively coughing during exam. She says she sometimes brings up green sputum. She denies fever, chills, shortness of breath, KNOWLES, chest pain, palpitations, orthopnea, peripheral edema. Has not been out of the bed since procedure until this afternoon. Has not been using incentive spirometer at bedside.     Medications:   gabapentin 400 BID  multivitamins    Surgical hx:   R THR in 2017    Social hx:   Active smoker     VITAL SIGNS:  Vital Signs Last 24 Hrs  T(C): 36.7 (28 Sep 2019 08:33), Max: 36.7 (28 Sep 2019 05:00)  T(F): 98 (28 Sep 2019 08:33), Max: 98 (28 Sep 2019 05:00)  HR: 104 (28 Sep 2019 12:30) (72 - 104)  BP: 100/64 (28 Sep 2019 12:30) (90/55 - 117/73)  BP(mean): 66 (28 Sep 2019 05:00) (56 - 88)  RR: 15 (28 Sep 2019 08:33) (10 - 23)  SpO2: 93% (28 Sep 2019 12:30) (77% - 100%)    09-27-19 @ 07:01  -  09-28-19 @ 07:00  --------------------------------------------------------  IN: 630 mL / OUT: 1852 mL / NET: -1222 mL    PHYSICAL EXAM:    General: WDWN female in NAD with 2   HEENT: NC/AT; PERRL, anicteric sclera; MMM  Neck: supple  Cardiovascular: +S1/S2; RRR  Respiratory: Scattered end expiratory wheezing over L> R. No rhonchi or crackles.   Gastrointestinal: soft, NT/ND; +BSx4  Extremities: WWP; no edema, clubbing or cyanosis  Vascular: 2+ radial, DP/PT pulses B/L  Neurological: AAOx3; no focal deficits    MEDICATIONS:  MEDICATIONS  (STANDING):  BUpivacaine liposome 1.3% Injectable (no eMAR) 20 milliLiter(s) Local Injection once  diazepam    Tablet 5 milliGRAM(s) Oral every 8 hours  docusate sodium 100 milliGRAM(s) Oral three times a day  enoxaparin Injectable 40 milliGRAM(s) SubCutaneous every 24 hours  gabapentin 400 milliGRAM(s) Oral two times a day  influenza   Vaccine 0.5 milliLiter(s) IntraMuscular once  nicotine -   7 mG/24Hr(s) Patch 1 patch Transdermal daily  senna 2 Tablet(s) Oral at bedtime    MEDICATIONS  (PRN):  naloxone Injectable 0.1 milliGRAM(s) IV Push every 3 minutes PRN For ANY of the following changes in patient status:  A. RR LESS THAN 10 breaths per minute, B. Oxygen saturation LESS THAN 90%, C. Sedation score of 6  ondansetron Injectable 4 milliGRAM(s) IV Push every 6 hours PRN Nausea  oxyCODONE    5 mG/acetaminophen 325 mG 1 Tablet(s) Oral every 4 hours PRN Moderate Pain (4 - 6)  oxyCODONE    5 mG/acetaminophen 325 mG 2 Tablet(s) Oral every 4 hours PRN Severe Pain (7 - 10)      ALLERGIES:  Allergies    No Known Allergies    Intolerances        LABS:                        11.8   13.93 )-----------( 246      ( 28 Sep 2019 07:26 )             36.3     09-28    140  |  101  |  10  ----------------------------<  147<H>  4.6   |  31  |  0.65    Ca    9.2      28 Sep 2019 07:26  Phos  3.7     09-28  Mg     2.0     09-28        CAPILLARY BLOOD GLUCOSE      POCT Blood Glucose.: 125 mg/dL (27 Sep 2019 11:48)        RADIOLOGY & ADDITIONAL TESTS: Reviewed.    ASSESSMENT:    PLAN: CONSULT NOTE    This is a 64 y.o female w/ PMH R THR, back pain for 7 years that failed conservative management, smoking for 40 years ( 1.p ppd, now cut down to 6-7 cigs daily) who is admitted for Rt L5-S1 TLIF (transforaminal lumbar interbody fusion) for spondylolisthesis. She is now POD # 1. Medicine was consulted for hypoxia to 80's? (unclear since not documented) after procedure requiring oxygen at 6L. Per pt, she has never been diagnosed with COPD but has been smoking for 40 years. Pt denied a cough when asked but was actively coughing during exam. She says she sometimes brings up green sputum. She denies fever, chills, shortness of breath, KNOWLES, chest pain, palpitations, orthopnea, peripheral edema. Has not been out of the bed since procedure until this afternoon. Has not been using incentive spirometer at bedside.     Medications:   gabapentin 400 BID  multivitamins    Surgical hx:   R THR in 2017    Social hx:   Active smoker     VITAL SIGNS:  Vital Signs Last 24 Hrs  T(C): 36.7 (28 Sep 2019 08:33), Max: 36.7 (28 Sep 2019 05:00)  T(F): 98 (28 Sep 2019 08:33), Max: 98 (28 Sep 2019 05:00)  HR: 104 (28 Sep 2019 12:30) (72 - 104)  BP: 100/64 (28 Sep 2019 12:30) (90/55 - 117/73)  BP(mean): 66 (28 Sep 2019 05:00) (56 - 88)  RR: 15 (28 Sep 2019 08:33) (10 - 23)  SpO2: 93% (28 Sep 2019 12:30) (77% - 100%)    09-27-19 @ 07:01  -  09-28-19 @ 07:00  --------------------------------------------------------  IN: 630 mL / OUT: 1852 mL / NET: -1222 mL    PHYSICAL EXAM:    General: WDWN female in NAD  HEENT: NC/AT; PERRL, anicteric sclera; MMM  Neck: supple  Cardiovascular: +S1/S2; RRR  Respiratory: Scattered end expiratory wheezing over L> R. No rhonchi or crackles.   Gastrointestinal: soft, NT/ND; +BSx4  Extremities: WWP; no edema, clubbing or cyanosis  Vascular: 2+ radial, DP/PT pulses B/L  Neurological: AAOx3; no focal deficits    MEDICATIONS:  MEDICATIONS  (STANDING):  BUpivacaine liposome 1.3% Injectable (no eMAR) 20 milliLiter(s) Local Injection once  diazepam    Tablet 5 milliGRAM(s) Oral every 8 hours  docusate sodium 100 milliGRAM(s) Oral three times a day  enoxaparin Injectable 40 milliGRAM(s) SubCutaneous every 24 hours  gabapentin 400 milliGRAM(s) Oral two times a day  influenza   Vaccine 0.5 milliLiter(s) IntraMuscular once  nicotine -   7 mG/24Hr(s) Patch 1 patch Transdermal daily  senna 2 Tablet(s) Oral at bedtime    MEDICATIONS  (PRN):  naloxone Injectable 0.1 milliGRAM(s) IV Push every 3 minutes PRN For ANY of the following changes in patient status:  A. RR LESS THAN 10 breaths per minute, B. Oxygen saturation LESS THAN 90%, C. Sedation score of 6  ondansetron Injectable 4 milliGRAM(s) IV Push every 6 hours PRN Nausea  oxyCODONE    5 mG/acetaminophen 325 mG 1 Tablet(s) Oral every 4 hours PRN Moderate Pain (4 - 6)  oxyCODONE    5 mG/acetaminophen 325 mG 2 Tablet(s) Oral every 4 hours PRN Severe Pain (7 - 10)      ALLERGIES:  Allergies    No Known Allergies    Intolerances        LABS:                        11.8   13.93 )-----------( 246      ( 28 Sep 2019 07:26 )             36.3     09-28    140  |  101  |  10  ----------------------------<  147<H>  4.6   |  31  |  0.65    Ca    9.2      28 Sep 2019 07:26  Phos  3.7     09-28  Mg     2.0     09-28        CAPILLARY BLOOD GLUCOSE      POCT Blood Glucose.: 125 mg/dL (27 Sep 2019 11:48)        RADIOLOGY & ADDITIONAL TESTS: Reviewed.    ASSESSMENT:    PLAN: ***INCOMPLETE***    CONSULT NOTE    This is a 64 y.o female w/ PMH R THR, back pain for 7 years that failed conservative management, smoking for 40 years ( 1.p ppd, now cut down to 6-7 cigs daily) who is admitted for Rt L5-S1 TLIF (transforaminal lumbar interbody fusion) for spondylolisthesis. She is now POD # 1. Medicine was consulted for hypoxia to 80's? (unclear since not documented) after procedure requiring oxygen at 6L. Per pt, she has never been diagnosed with COPD but has been smoking for 40 years. Pt denied a cough when asked but was actively coughing during exam. She says she sometimes brings up green sputum. She denies fever, chills, shortness of breath, KNOWLES, chest pain, palpitations, orthopnea, peripheral edema. Has not been out of the bed since procedure until this afternoon. Has not been using incentive spirometer at bedside.     Medications:   gabapentin 400 BID  multivitamins    Surgical hx:   R THR in 2017    Social hx:   Active smoker     VITAL SIGNS:  Vital Signs Last 24 Hrs  T(C): 36.7 (28 Sep 2019 08:33), Max: 36.7 (28 Sep 2019 05:00)  T(F): 98 (28 Sep 2019 08:33), Max: 98 (28 Sep 2019 05:00)  HR: 104 (28 Sep 2019 12:30) (72 - 104)  BP: 100/64 (28 Sep 2019 12:30) (90/55 - 117/73)  BP(mean): 66 (28 Sep 2019 05:00) (56 - 88)  RR: 15 (28 Sep 2019 08:33) (10 - 23)  SpO2: 93% (28 Sep 2019 12:30) (77% - 100%)    09-27-19 @ 07:01  -  09-28-19 @ 07:00  --------------------------------------------------------  IN: 630 mL / OUT: 1852 mL / NET: -1222 mL    PHYSICAL EXAM:    General: WDWN female in NAD  HEENT: NC/AT; PERRL, anicteric sclera; MMM  Neck: supple  Cardiovascular: +S1/S2; RRR  Respiratory: Scattered end expiratory wheezing over L> R. No rhonchi or crackles.   Gastrointestinal: soft, NT/ND; +BSx4  Extremities: WWP; no edema, clubbing or cyanosis  Vascular: 2+ radial, DP/PT pulses B/L  Neurological: AAOx3; no focal deficits    MEDICATIONS:  MEDICATIONS  (STANDING):  BUpivacaine liposome 1.3% Injectable (no eMAR) 20 milliLiter(s) Local Injection once  diazepam    Tablet 5 milliGRAM(s) Oral every 8 hours  docusate sodium 100 milliGRAM(s) Oral three times a day  enoxaparin Injectable 40 milliGRAM(s) SubCutaneous every 24 hours  gabapentin 400 milliGRAM(s) Oral two times a day  influenza   Vaccine 0.5 milliLiter(s) IntraMuscular once  nicotine -   7 mG/24Hr(s) Patch 1 patch Transdermal daily  senna 2 Tablet(s) Oral at bedtime    MEDICATIONS  (PRN):  naloxone Injectable 0.1 milliGRAM(s) IV Push every 3 minutes PRN For ANY of the following changes in patient status:  A. RR LESS THAN 10 breaths per minute, B. Oxygen saturation LESS THAN 90%, C. Sedation score of 6  ondansetron Injectable 4 milliGRAM(s) IV Push every 6 hours PRN Nausea  oxyCODONE    5 mG/acetaminophen 325 mG 1 Tablet(s) Oral every 4 hours PRN Moderate Pain (4 - 6)  oxyCODONE    5 mG/acetaminophen 325 mG 2 Tablet(s) Oral every 4 hours PRN Severe Pain (7 - 10)      ALLERGIES:  Allergies    No Known Allergies    Intolerances        LABS:                        11.8   13.93 )-----------( 246      ( 28 Sep 2019 07:26 )             36.3     09-28    140  |  101  |  10  ----------------------------<  147<H>  4.6   |  31  |  0.65    Ca    9.2      28 Sep 2019 07:26  Phos  3.7     09-28  Mg     2.0     09-28        CAPILLARY BLOOD GLUCOSE      POCT Blood Glucose.: 125 mg/dL (27 Sep 2019 11:48) CONSULT NOTE    This is a 64 y.o female w/ PMH R THR, back pain for 7 years that failed conservative management, smoking for 40 years ( 1.p ppd, now cut down to 6-7 cigs daily) who is admitted for Rt L5-S1 TLIF (transforaminal lumbar interbody fusion) for spondylolisthesis. She is now POD # 1. Medicine was consulted for hypoxia to 80's? (unclear since not documented) after procedure requiring oxygen at 6L. Per pt, she has never been diagnosed with COPD but has been smoking for 40 years. Pt denied a cough when asked but was actively coughing during exam. She says she sometimes brings up green sputum. She denies fever, chills, shortness of breath, KNOWLES, chest pain, palpitations, orthopnea, peripheral edema. Has not been out of the bed since procedure until this afternoon. Has not been using incentive spirometer at bedside.     Medications:   gabapentin 400 BID  multivitamins    Surgical hx:   R THR in 2017    Social hx:   Active smoker     VITAL SIGNS:  Vital Signs Last 24 Hrs  T(C): 36.7 (28 Sep 2019 08:33), Max: 36.7 (28 Sep 2019 05:00)  T(F): 98 (28 Sep 2019 08:33), Max: 98 (28 Sep 2019 05:00)  HR: 104 (28 Sep 2019 12:30) (72 - 104)  BP: 100/64 (28 Sep 2019 12:30) (90/55 - 117/73)  BP(mean): 66 (28 Sep 2019 05:00) (56 - 88)  RR: 15 (28 Sep 2019 08:33) (10 - 23)  SpO2: 93% (28 Sep 2019 12:30) (77% - 100%)    09-27-19 @ 07:01  -  09-28-19 @ 07:00  --------------------------------------------------------  IN: 630 mL / OUT: 1852 mL / NET: -1222 mL    PHYSICAL EXAM:    General: WDWN female in NAD  HEENT: NC/AT; PERRL, anicteric sclera; MMM  Neck: supple  Cardiovascular: +S1/S2; RRR  Respiratory: Scattered end expiratory wheezing over L> R. No rhonchi or crackles.   Gastrointestinal: soft, NT/ND; +BSx4  Extremities: WWP; no edema, clubbing or cyanosis  Vascular: 2+ radial, DP/PT pulses B/L  Neurological: AAOx3; no focal deficits    MEDICATIONS:  MEDICATIONS  (STANDING):  BUpivacaine liposome 1.3% Injectable (no eMAR) 20 milliLiter(s) Local Injection once  diazepam    Tablet 5 milliGRAM(s) Oral every 8 hours  docusate sodium 100 milliGRAM(s) Oral three times a day  enoxaparin Injectable 40 milliGRAM(s) SubCutaneous every 24 hours  gabapentin 400 milliGRAM(s) Oral two times a day  influenza   Vaccine 0.5 milliLiter(s) IntraMuscular once  nicotine -   7 mG/24Hr(s) Patch 1 patch Transdermal daily  senna 2 Tablet(s) Oral at bedtime    MEDICATIONS  (PRN):  naloxone Injectable 0.1 milliGRAM(s) IV Push every 3 minutes PRN For ANY of the following changes in patient status:  A. RR LESS THAN 10 breaths per minute, B. Oxygen saturation LESS THAN 90%, C. Sedation score of 6  ondansetron Injectable 4 milliGRAM(s) IV Push every 6 hours PRN Nausea  oxyCODONE    5 mG/acetaminophen 325 mG 1 Tablet(s) Oral every 4 hours PRN Moderate Pain (4 - 6)  oxyCODONE    5 mG/acetaminophen 325 mG 2 Tablet(s) Oral every 4 hours PRN Severe Pain (7 - 10)      ALLERGIES:  Allergies    No Known Allergies    Intolerances        LABS:                        11.8   13.93 )-----------( 246      ( 28 Sep 2019 07:26 )             36.3     09-28    140  |  101  |  10  ----------------------------<  147<H>  4.6   |  31  |  0.65    Ca    9.2      28 Sep 2019 07:26  Phos  3.7     09-28  Mg     2.0     09-28        CAPILLARY BLOOD GLUCOSE      POCT Blood Glucose.: 125 mg/dL (27 Sep 2019 11:48) CONSULT NOTE    This is a 64 y.o female w/ PMH R THR, back pain for 7 years that failed conservative management, smoking for 40 years ( 1.p ppd, now cut down to 6-7 cigs daily) who is admitted for Rt L5-S1 TLIF (transforaminal lumbar interbody fusion) for spondylolisthesis. She is now POD # 1. Medicine was consulted for hypoxia to 80's? (unclear since not documented) after procedure requiring oxygen at 6L. Per pt, she has never been diagnosed with COPD but has been smoking for 40 years. Pt denied a cough when asked but was actively coughing during exam. She says she sometimes brings up green sputum. She denies fever, chills, shortness of breath, KNOWLES, chest pain, palpitations, orthopnea, peripheral edema. Has not been out of the bed since procedure until this afternoon. Has not been using incentive spirometer at bedside.     Medications:   gabapentin 400 BID  multivitamins    Surgical hx:   R THR in 2017    Social hx:   Active smoker     VITAL SIGNS:  Vital Signs Last 24 Hrs  T(C): 36.7 (28 Sep 2019 08:33), Max: 36.7 (28 Sep 2019 05:00)  T(F): 98 (28 Sep 2019 08:33), Max: 98 (28 Sep 2019 05:00)  HR: 104 (28 Sep 2019 12:30) (72 - 104)  BP: 100/64 (28 Sep 2019 12:30) (90/55 - 117/73)  BP(mean): 66 (28 Sep 2019 05:00) (56 - 88)  RR: 15 (28 Sep 2019 08:33) (10 - 23)  SpO2: 93% (28 Sep 2019 12:30) (77% - 100%)    09-27-19 @ 07:01  -  09-28-19 @ 07:00  --------------------------------------------------------  IN: 630 mL / OUT: 1852 mL / NET: -1222 mL    PHYSICAL EXAM:    General: WDWN female in NAD  HEENT: NC/AT; PERRL, anicteric sclera; MMM  Neck: supple  Cardiovascular: +S1/S2; RRR  Respiratory: Scattered end expiratory wheezing over L> R. No rhonchi or crackles.   Gastrointestinal: soft, NT/ND; +BSx4  Extremities: WWP; no edema, clubbing or cyanosis  Vascular: 2+ radial, DP/PT pulses B/L  Neurological: AAOx3; no focal deficits    MEDICATIONS:  MEDICATIONS  (STANDING):  BUpivacaine liposome 1.3% Injectable (no eMAR) 20 milliLiter(s) Local Injection once  diazepam    Tablet 5 milliGRAM(s) Oral every 8 hours  docusate sodium 100 milliGRAM(s) Oral three times a day  enoxaparin Injectable 40 milliGRAM(s) SubCutaneous every 24 hours  gabapentin 400 milliGRAM(s) Oral two times a day  influenza   Vaccine 0.5 milliLiter(s) IntraMuscular once  nicotine -   7 mG/24Hr(s) Patch 1 patch Transdermal daily  senna 2 Tablet(s) Oral at bedtime    MEDICATIONS  (PRN):  naloxone Injectable 0.1 milliGRAM(s) IV Push every 3 minutes PRN For ANY of the following changes in patient status:  A. RR LESS THAN 10 breaths per minute, B. Oxygen saturation LESS THAN 90%, C. Sedation score of 6  ondansetron Injectable 4 milliGRAM(s) IV Push every 6 hours PRN Nausea  oxyCODONE    5 mG/acetaminophen 325 mG 1 Tablet(s) Oral every 4 hours PRN Moderate Pain (4 - 6)  oxyCODONE    5 mG/acetaminophen 325 mG 2 Tablet(s) Oral every 4 hours PRN Severe Pain (7 - 10)      ALLERGIES:  Allergies    No Known Allergies    Intolerances        LABS:                        11.8   13.93 )-----------( 246      ( 28 Sep 2019 07:26 )             36.3     09-28    140  |  101  |  10  ----------------------------<  147<H>  4.6   |  31  |  0.65    Ca    9.2      28 Sep 2019 07:26  Phos  3.7     09-28  Mg     2.0     09-28    CAPILLARY BLOOD GLUCOSE    POCT Blood Glucose.: 125 mg/dL (27 Sep 2019 11:48) CONSULT NOTE    This is a 64 y.o female w/ PMH R THR, back pain for 7 years that failed conservative management, smoking for 40 years ( 1.p ppd, now cut down to 6-7 cigs daily) who is admitted for Rt L5-S1 TLIF (transforaminal lumbar interbody fusion) for spondylolisthesis. She is now POD # 1. Medicine was consulted for hypoxia to 80's? (unclear since not documented) after procedure requiring oxygen at 6L. Per pt, she has never been diagnosed with COPD but has been smoking for 40 years. Pt denied a cough when asked but was actively coughing during exam. She says she sometimes brings up green sputum. She denies fever, chills, shortness of breath, KNOWLES, chest pain, palpitations, orthopnea, peripheral edema. Has not been out of the bed since procedure until this afternoon. Has not been using incentive spirometer at bedside.     Medications:   gabapentin 400 BID  multivitamins    Surgical hx:   R THR in 2017    Social hx:   Active smoker     FamHx:   MGF Bronchitis and emphysema    VITAL SIGNS:  Vital Signs Last 24 Hrs  T(C): 36.7 (28 Sep 2019 08:33), Max: 36.7 (28 Sep 2019 05:00)  T(F): 98 (28 Sep 2019 08:33), Max: 98 (28 Sep 2019 05:00)  HR: 104 (28 Sep 2019 12:30) (72 - 104)  BP: 100/64 (28 Sep 2019 12:30) (90/55 - 117/73)  BP(mean): 66 (28 Sep 2019 05:00) (56 - 88)  RR: 15 (28 Sep 2019 08:33) (10 - 23)  SpO2: 93% (28 Sep 2019 12:30) (77% - 100%)    09-27-19 @ 07:01  -  09-28-19 @ 07:00  --------------------------------------------------------  IN: 630 mL / OUT: 1852 mL / NET: -1222 mL    PHYSICAL EXAM:    General: WDWN female in NAD  HEENT: NC/AT; PERRL, anicteric sclera; MMM  Neck: supple  Cardiovascular: +S1/S2; RRR  Respiratory: Scattered end expiratory wheezing over L> R. No rhonchi or crackles.   Gastrointestinal: soft, NT/ND; +BSx4  Extremities: WWP; no edema, clubbing or cyanosis  Vascular: 2+ radial, DP/PT pulses B/L  Neurological: AAOx3; no focal deficits    MEDICATIONS:  MEDICATIONS  (STANDING):  BUpivacaine liposome 1.3% Injectable (no eMAR) 20 milliLiter(s) Local Injection once  diazepam    Tablet 5 milliGRAM(s) Oral every 8 hours  docusate sodium 100 milliGRAM(s) Oral three times a day  enoxaparin Injectable 40 milliGRAM(s) SubCutaneous every 24 hours  gabapentin 400 milliGRAM(s) Oral two times a day  influenza   Vaccine 0.5 milliLiter(s) IntraMuscular once  nicotine -   7 mG/24Hr(s) Patch 1 patch Transdermal daily  senna 2 Tablet(s) Oral at bedtime    MEDICATIONS  (PRN):  naloxone Injectable 0.1 milliGRAM(s) IV Push every 3 minutes PRN For ANY of the following changes in patient status:  A. RR LESS THAN 10 breaths per minute, B. Oxygen saturation LESS THAN 90%, C. Sedation score of 6  ondansetron Injectable 4 milliGRAM(s) IV Push every 6 hours PRN Nausea  oxyCODONE    5 mG/acetaminophen 325 mG 1 Tablet(s) Oral every 4 hours PRN Moderate Pain (4 - 6)  oxyCODONE    5 mG/acetaminophen 325 mG 2 Tablet(s) Oral every 4 hours PRN Severe Pain (7 - 10)      ALLERGIES:  Allergies    No Known Allergies    Intolerances    LABS:                        11.8   13.93 )-----------( 246      ( 28 Sep 2019 07:26 )             36.3     09-28    140  |  101  |  10  ----------------------------<  147<H>  4.6   |  31  |  0.65    Ca    9.2      28 Sep 2019 07:26  Phos  3.7     09-28  Mg     2.0     09-28    CAPILLARY BLOOD GLUCOSE    POCT Blood Glucose.: 125 mg/dL (27 Sep 2019 11:48)

## 2019-09-28 NOTE — PHYSICAL THERAPY INITIAL EVALUATION ADULT - PLANNED THERAPY INTERVENTIONS, PT EVAL
strengthening/neuromuscular re-education/postural re-education/transfer training/balance training/gait training/bed mobility training

## 2019-09-28 NOTE — PHYSICAL THERAPY INITIAL EVALUATION ADULT - ADDITIONAL COMMENTS
Patient lives with daughter in apartment building with 3 steps to enter. Does not use any Assistive Devices at baseline. Owns SC.

## 2019-09-28 NOTE — CONSULT NOTE ADULT - ASSESSMENT
63 yo woman with ongoing smoking,  depression, back pain with onset of 7 years, and perineal pain underwent spinal surgery ( Rt L5-S1 TLIF (transforaminal lumbar interbody fusion) for spondylolisthesis now hypoxemic.

## 2019-09-28 NOTE — PHYSICAL THERAPY INITIAL EVALUATION ADULT - CRITERIA FOR SKILLED THERAPEUTIC INTERVENTIONS
functional limitations in following categories/predicted duration of therapy intervention/impairments found/therapy frequency/anticipated discharge recommendation/risk reduction/prevention

## 2019-09-28 NOTE — CONSULT NOTE ADULT - PROBLEM SELECTOR RECOMMENDATION 3
Needs smoking cessation, use 7 mg nicotine patch for now.   - this needs to be addressed out patient.  - She should have out patient PFT.   - She is also a candidate for Lung cancer screening given her smoking hx.   - She would like to follow up with Pulmonologist here, Please have her follow up with Dr Herrera as out patient in 2 -4 weeks time.

## 2019-09-28 NOTE — CONSULT NOTE ADULT - PROBLEM SELECTOR RECOMMENDATION 9
Secondary to postoperative atelectasis  low pre test possibility of PE  Not sure if she has COPD but certainly not in exacerbation.   - Aggressive incentive spirometry for recruitment , at least few times /hr, patient taught correct way to use it.  - Make sure pain control is adequate to avoid splinting.   - OOB, ambulatory if that is ok with Neurosurgery.   - Can use bronchodilator PRN if she is wheezing , for me she was not.   - Avoid opioids to make sure she has good respiratory drive.  - low pre test possibility of PE, wont check D dimer as she is post operative.  - consider CTA if she worsens or does not improve with above measures.  - wean o2 with gaol Spo2 > 90-92%.

## 2019-09-28 NOTE — PHYSICAL THERAPY INITIAL EVALUATION ADULT - GENERAL OBSERVATIONS, REHAB EVAL
Patient received semi-blancas in bed  in NAD on RA, +SCDs, +Hemovac x2, +IV, +PCA. Cleared by CANDELARIO Campbell. Agreeable to PT.

## 2019-09-28 NOTE — CONSULT NOTE ADULT - SUBJECTIVE AND OBJECTIVE BOX
PULMONARY SERVICE INITIAL CONSULT NOTE    HPI:  Mrs. Villafana is a 63 yo woman with PMH of smoking (40 pack yrs), R THR, depression, back pain with onset of 7 years, and perineal pain underwent spinal surgery ( Rt L5-S1 TLIF (transforaminal lumbar interbody fusion) for spondylolisthesis now POD#1. Pulmonology consulted for  hypoxia to 80's in post operative period needing 6L NC. Patient is a life long smoker , about 80 pack yrs, still smoking upto the time of surgery 6 cigs/ day, says helps to deal with the back pain. Never evaluated or diagnosed with COPD / asthma. Patient reports smokers cough but there was no change in character during pre operative period. She denies fever, chills, shortness of breath, KNOWLES, chest pain, palpitations, orthopnea, peripheral edema.   No recent long drive or air travel.      REVIEW OF SYSTEMS:  Constitutional: No fever, weight loss or fatigue  Eyes: No eye pain, visual disturbances, or discharge  ENMT:  No difficulty hearing, tinnitus, vertigo; No sinus or throat pain  Neck: No pain, stiffness or neck swelling  Respiratory: see HPI  Cardiovascular: No chest pain, palpitations, dizziness or leg swelling  Gastrointestinal: No abdominal or epigastric pain. No nausea, vomiting or hematemesis; No diarrhea or constipation. No melena or hematochezia.  Genitourinary: No dysuria, frequency, hematuria or incontinence  Neurological: No headaches, memory loss, loss of strength, numbness or tremors  Skin: No itching, burning, rashes or lesions   Lymph Nodes: No enlarged glands  Endocrine: No heat or cold intolerance; No hair loss  Musculoskeletal: No joint pain or swelling; No muscle, back or extremity pain  Psychiatric: No depression, anxiety, mood swings or difficulty sleeping  Heme/Lymph: No easy bruising or bleeding gums  Allergy and Immunologic: No hives or eczema    PAST MEDICAL & SURGICAL HISTORY:  Spondylolisthesis, lumbosacral region  History of total hip replacement, right  Elective surgery: jaw childhhod      FAMILY HISTORY:      SOCIAL HISTORY:  Smoking Status: [x ] Current, [ ] Former, [ ] Never  Pack Years:80    MEDICATIONS:  Pulmonary:    Antimicrobials:    Anticoagulants:  enoxaparin Injectable 40 milliGRAM(s) SubCutaneous every 24 hours    Onc:    GI/:  docusate sodium 100 milliGRAM(s) Oral three times a day  senna 2 Tablet(s) Oral at bedtime    Endocrine:    Cardiac:    Other Medications:  BUpivacaine liposome 1.3% Injectable (no eMAR) 20 milliLiter(s) Local Injection once  diazepam    Tablet 5 milliGRAM(s) Oral every 8 hours  gabapentin 400 milliGRAM(s) Oral two times a day  influenza   Vaccine 0.5 milliLiter(s) IntraMuscular once  naloxone Injectable 0.1 milliGRAM(s) IV Push every 3 minutes PRN  nicotine -   7 mG/24Hr(s) Patch 1 patch Transdermal daily  ondansetron Injectable 4 milliGRAM(s) IV Push every 6 hours PRN  oxyCODONE    5 mG/acetaminophen 325 mG 1 Tablet(s) Oral every 4 hours PRN  oxyCODONE    5 mG/acetaminophen 325 mG 2 Tablet(s) Oral every 4 hours PRN      Allergies    No Known Allergies    Intolerances        Vital Signs Last 24 Hrs  T(C): 36.7 (28 Sep 2019 20:53), Max: 36.7 (28 Sep 2019 05:00)  T(F): 98 (28 Sep 2019 20:53), Max: 98 (28 Sep 2019 05:00)  HR: 79 (28 Sep 2019 20:53) (75 - 104)  BP: 104/69 (28 Sep 2019 20:53) (100/64 - 112/76)  BP(mean): 66 (28 Sep 2019 05:00) (66 - 66)  RR: 17 (28 Sep 2019 20:53) (12 - 17)  SpO2: 92% (28 Sep 2019 20:53) (77% - 93%)    09-27 @ 07:01 - 09-28 @ 07:00  --------------------------------------------------------  IN: 630 mL / OUT: 1852 mL / NET: -1222 mL    09-28 @ 07:01 - 09-28 @ 20:58  --------------------------------------------------------  IN: 0 mL / OUT: 135 mL / NET: -135 mL          PHYSICAL EXAM:  Constitutional: WDWN  EENT: PERRL, anicteric sclera; oropharynx clear, MMM  Neck: supple, no appreciable JVD  Respiratory: basilar fine crackles posteriorly  Cardiovascular: +S1/S2, RRR  Gastrointestinal: soft, NT/ND; +BSx4  Extremities:  clubbing +  Neurological: AAOx3; no focal deficits    LABS:  ABG - ( 27 Sep 2019 11:46 )  pH, Arterial: 7.32  pH, Blood: x     /  pCO2: 47    /  pO2: 358   / HCO3: 24    / Base Excess: -2.9  /  SaO2: x         CBC Full  -  ( 28 Sep 2019 07:26 )  WBC Count : 13.93 K/uL  RBC Count : 4.11 M/uL  Hemoglobin : 11.8 g/dL  Hematocrit : 36.3 %  Platelet Count - Automated : 246 K/uL  Mean Cell Volume : 88.3 fl  Mean Cell Hemoglobin : 28.7 pg  Mean Cell Hemoglobin Concentration : 32.5 gm/dL  Auto Neutrophil # : 12.38 K/uL  Auto Lymphocyte # : 0.95 K/uL  Auto Monocyte # : 0.48 K/uL  Auto Eosinophil # : 0.00 K/uL  Auto Basophil # : 0.02 K/uL  Auto Neutrophil % : 89.0 %  Auto Lymphocyte % : 6.8 %  Auto Monocyte % : 3.4 %  Auto Eosinophil % : 0.0 %  Auto Basophil % : 0.1 %    09-28    140  |  101  |  10  ----------------------------<  147<H>  4.6   |  31  |  0.65    Ca    9.2      28 Sep 2019 07:26  Phos  3.7     09-28  Mg     2.0     09-28      RADIOLOGY & ADDITIONAL STUDIES:  CXR reviewed.

## 2019-09-29 LAB
ANION GAP SERPL CALC-SCNC: 8 MMOL/L — SIGNIFICANT CHANGE UP (ref 5–17)
BASOPHILS # BLD AUTO: 0.02 K/UL — SIGNIFICANT CHANGE UP (ref 0–0.2)
BASOPHILS NFR BLD AUTO: 0.1 % — SIGNIFICANT CHANGE UP (ref 0–2)
BUN SERPL-MCNC: 14 MG/DL — SIGNIFICANT CHANGE UP (ref 7–23)
CALCIUM SERPL-MCNC: 9 MG/DL — SIGNIFICANT CHANGE UP (ref 8.4–10.5)
CHLORIDE SERPL-SCNC: 103 MMOL/L — SIGNIFICANT CHANGE UP (ref 96–108)
CO2 SERPL-SCNC: 30 MMOL/L — SIGNIFICANT CHANGE UP (ref 22–31)
CREAT SERPL-MCNC: 0.66 MG/DL — SIGNIFICANT CHANGE UP (ref 0.5–1.3)
EOSINOPHIL # BLD AUTO: 0.03 K/UL — SIGNIFICANT CHANGE UP (ref 0–0.5)
EOSINOPHIL NFR BLD AUTO: 0.2 % — SIGNIFICANT CHANGE UP (ref 0–6)
GLUCOSE BLDC GLUCOMTR-MCNC: 124 MG/DL — HIGH (ref 70–99)
GLUCOSE SERPL-MCNC: 109 MG/DL — HIGH (ref 70–99)
HCT VFR BLD CALC: 37.2 % — SIGNIFICANT CHANGE UP (ref 34.5–45)
HGB BLD-MCNC: 11.5 G/DL — SIGNIFICANT CHANGE UP (ref 11.5–15.5)
IMM GRANULOCYTES NFR BLD AUTO: 1 % — SIGNIFICANT CHANGE UP (ref 0–1.5)
LYMPHOCYTES # BLD AUTO: 18.9 % — SIGNIFICANT CHANGE UP (ref 13–44)
LYMPHOCYTES # BLD AUTO: 2.82 K/UL — SIGNIFICANT CHANGE UP (ref 1–3.3)
MCHC RBC-ENTMCNC: 28 PG — SIGNIFICANT CHANGE UP (ref 27–34)
MCHC RBC-ENTMCNC: 30.9 GM/DL — LOW (ref 32–36)
MCV RBC AUTO: 90.7 FL — SIGNIFICANT CHANGE UP (ref 80–100)
MONOCYTES # BLD AUTO: 1.03 K/UL — HIGH (ref 0–0.9)
MONOCYTES NFR BLD AUTO: 6.9 % — SIGNIFICANT CHANGE UP (ref 2–14)
NEUTROPHILS # BLD AUTO: 10.84 K/UL — HIGH (ref 1.8–7.4)
NEUTROPHILS NFR BLD AUTO: 72.9 % — SIGNIFICANT CHANGE UP (ref 43–77)
NRBC # BLD: 0 /100 WBCS — SIGNIFICANT CHANGE UP (ref 0–0)
PLATELET # BLD AUTO: 249 K/UL — SIGNIFICANT CHANGE UP (ref 150–400)
POTASSIUM SERPL-MCNC: 4.3 MMOL/L — SIGNIFICANT CHANGE UP (ref 3.5–5.3)
POTASSIUM SERPL-SCNC: 4.3 MMOL/L — SIGNIFICANT CHANGE UP (ref 3.5–5.3)
RBC # BLD: 4.1 M/UL — SIGNIFICANT CHANGE UP (ref 3.8–5.2)
RBC # FLD: 15 % — HIGH (ref 10.3–14.5)
SODIUM SERPL-SCNC: 141 MMOL/L — SIGNIFICANT CHANGE UP (ref 135–145)
WBC # BLD: 14.89 K/UL — HIGH (ref 3.8–10.5)
WBC # FLD AUTO: 14.89 K/UL — HIGH (ref 3.8–10.5)

## 2019-09-29 PROCEDURE — 99233 SBSQ HOSP IP/OBS HIGH 50: CPT

## 2019-09-29 RX ORDER — IPRATROPIUM/ALBUTEROL SULFATE 18-103MCG
3 AEROSOL WITH ADAPTER (GRAM) INHALATION EVERY 6 HOURS
Refills: 0 | Status: DISCONTINUED | OUTPATIENT
Start: 2019-09-29 | End: 2019-10-01

## 2019-09-29 RX ADMIN — OXYCODONE AND ACETAMINOPHEN 2 TABLET(S): 5; 325 TABLET ORAL at 13:49

## 2019-09-29 RX ADMIN — GABAPENTIN 400 MILLIGRAM(S): 400 CAPSULE ORAL at 05:16

## 2019-09-29 RX ADMIN — Medication 5 MILLIGRAM(S): at 22:13

## 2019-09-29 RX ADMIN — OXYCODONE AND ACETAMINOPHEN 2 TABLET(S): 5; 325 TABLET ORAL at 05:14

## 2019-09-29 RX ADMIN — Medication 5 MILLIGRAM(S): at 00:15

## 2019-09-29 RX ADMIN — OXYCODONE AND ACETAMINOPHEN 2 TABLET(S): 5; 325 TABLET ORAL at 12:49

## 2019-09-29 RX ADMIN — Medication 5 MILLIGRAM(S): at 08:03

## 2019-09-29 RX ADMIN — OXYCODONE AND ACETAMINOPHEN 2 TABLET(S): 5; 325 TABLET ORAL at 18:34

## 2019-09-29 RX ADMIN — OXYCODONE AND ACETAMINOPHEN 2 TABLET(S): 5; 325 TABLET ORAL at 19:34

## 2019-09-29 RX ADMIN — GABAPENTIN 400 MILLIGRAM(S): 400 CAPSULE ORAL at 18:34

## 2019-09-29 RX ADMIN — ENOXAPARIN SODIUM 40 MILLIGRAM(S): 100 INJECTION SUBCUTANEOUS at 22:14

## 2019-09-29 NOTE — PROGRESS NOTE ADULT - ASSESSMENT
63 YO F h/o chronic back pain, s/p R THR, tobacco abuse (currently 0.25 PPD, previously 1-1.5 PPD x 40 years) admitted for R L5-S1 TLIF for spondylolisthesis. POD 1. Medicine consulted for hypoxia noted overnight to the 70s-80s.     # Hypoxia of unclear etiology  - DDx includes COPD vs atelectasis vs pulmonary embolism vs infection  - CXR shows discoid changes, no focal consolidation appreciated, thus PNA  - Chronic cough with sputum is suggestive of emphysema/bronchiectasis consistent with COPD, however, per pulm not currently in exacerbation  - Appreciate pulm recs, recommended aggressive incentive spirometry use  - Low suspicion for PE, however, if worsening clinical status would check CTA chest to r/o PE  - Please consult pulmonary  - C/w supplemental O2

## 2019-09-30 LAB
ANION GAP SERPL CALC-SCNC: 8 MMOL/L — SIGNIFICANT CHANGE UP (ref 5–17)
BASOPHILS # BLD AUTO: 0.08 K/UL — SIGNIFICANT CHANGE UP (ref 0–0.2)
BASOPHILS NFR BLD AUTO: 0.8 % — SIGNIFICANT CHANGE UP (ref 0–2)
BUN SERPL-MCNC: 12 MG/DL — SIGNIFICANT CHANGE UP (ref 7–23)
CALCIUM SERPL-MCNC: 8.8 MG/DL — SIGNIFICANT CHANGE UP (ref 8.4–10.5)
CHLORIDE SERPL-SCNC: 103 MMOL/L — SIGNIFICANT CHANGE UP (ref 96–108)
CO2 SERPL-SCNC: 30 MMOL/L — SIGNIFICANT CHANGE UP (ref 22–31)
CREAT SERPL-MCNC: 0.68 MG/DL — SIGNIFICANT CHANGE UP (ref 0.5–1.3)
EOSINOPHIL # BLD AUTO: 0.24 K/UL — SIGNIFICANT CHANGE UP (ref 0–0.5)
EOSINOPHIL NFR BLD AUTO: 2.3 % — SIGNIFICANT CHANGE UP (ref 0–6)
GLUCOSE SERPL-MCNC: 99 MG/DL — SIGNIFICANT CHANGE UP (ref 70–99)
HCT VFR BLD CALC: 37.6 % — SIGNIFICANT CHANGE UP (ref 34.5–45)
HGB BLD-MCNC: 11.9 G/DL — SIGNIFICANT CHANGE UP (ref 11.5–15.5)
IMM GRANULOCYTES NFR BLD AUTO: 1.3 % — SIGNIFICANT CHANGE UP (ref 0–1.5)
LYMPHOCYTES # BLD AUTO: 3.2 K/UL — SIGNIFICANT CHANGE UP (ref 1–3.3)
LYMPHOCYTES # BLD AUTO: 30.5 % — SIGNIFICANT CHANGE UP (ref 13–44)
MAGNESIUM SERPL-MCNC: 2 MG/DL — SIGNIFICANT CHANGE UP (ref 1.6–2.6)
MCHC RBC-ENTMCNC: 28.5 PG — SIGNIFICANT CHANGE UP (ref 27–34)
MCHC RBC-ENTMCNC: 31.6 GM/DL — LOW (ref 32–36)
MCV RBC AUTO: 90.2 FL — SIGNIFICANT CHANGE UP (ref 80–100)
MONOCYTES # BLD AUTO: 0.82 K/UL — SIGNIFICANT CHANGE UP (ref 0–0.9)
MONOCYTES NFR BLD AUTO: 7.8 % — SIGNIFICANT CHANGE UP (ref 2–14)
NEUTROPHILS # BLD AUTO: 6 K/UL — SIGNIFICANT CHANGE UP (ref 1.8–7.4)
NEUTROPHILS NFR BLD AUTO: 57.3 % — SIGNIFICANT CHANGE UP (ref 43–77)
NRBC # BLD: 0 /100 WBCS — SIGNIFICANT CHANGE UP (ref 0–0)
PHOSPHATE SERPL-MCNC: 3.7 MG/DL — SIGNIFICANT CHANGE UP (ref 2.5–4.5)
PLATELET # BLD AUTO: 259 K/UL — SIGNIFICANT CHANGE UP (ref 150–400)
POTASSIUM SERPL-MCNC: 4.5 MMOL/L — SIGNIFICANT CHANGE UP (ref 3.5–5.3)
POTASSIUM SERPL-SCNC: 4.5 MMOL/L — SIGNIFICANT CHANGE UP (ref 3.5–5.3)
RBC # BLD: 4.17 M/UL — SIGNIFICANT CHANGE UP (ref 3.8–5.2)
RBC # FLD: 14.9 % — HIGH (ref 10.3–14.5)
SODIUM SERPL-SCNC: 141 MMOL/L — SIGNIFICANT CHANGE UP (ref 135–145)
WBC # BLD: 10.48 K/UL — SIGNIFICANT CHANGE UP (ref 3.8–10.5)
WBC # FLD AUTO: 10.48 K/UL — SIGNIFICANT CHANGE UP (ref 3.8–10.5)

## 2019-09-30 PROCEDURE — 99233 SBSQ HOSP IP/OBS HIGH 50: CPT | Mod: GC

## 2019-09-30 RX ADMIN — OXYCODONE AND ACETAMINOPHEN 2 TABLET(S): 5; 325 TABLET ORAL at 05:42

## 2019-09-30 RX ADMIN — OXYCODONE AND ACETAMINOPHEN 2 TABLET(S): 5; 325 TABLET ORAL at 22:27

## 2019-09-30 RX ADMIN — OXYCODONE AND ACETAMINOPHEN 2 TABLET(S): 5; 325 TABLET ORAL at 21:27

## 2019-09-30 RX ADMIN — Medication 5 MILLIGRAM(S): at 17:34

## 2019-09-30 RX ADMIN — GABAPENTIN 400 MILLIGRAM(S): 400 CAPSULE ORAL at 18:34

## 2019-09-30 RX ADMIN — OXYCODONE AND ACETAMINOPHEN 2 TABLET(S): 5; 325 TABLET ORAL at 04:42

## 2019-09-30 RX ADMIN — ENOXAPARIN SODIUM 40 MILLIGRAM(S): 100 INJECTION SUBCUTANEOUS at 21:27

## 2019-09-30 RX ADMIN — GABAPENTIN 400 MILLIGRAM(S): 400 CAPSULE ORAL at 06:10

## 2019-09-30 RX ADMIN — OXYCODONE AND ACETAMINOPHEN 2 TABLET(S): 5; 325 TABLET ORAL at 15:04

## 2019-09-30 RX ADMIN — OXYCODONE AND ACETAMINOPHEN 2 TABLET(S): 5; 325 TABLET ORAL at 14:04

## 2019-09-30 RX ADMIN — Medication 5 MILLIGRAM(S): at 06:10

## 2019-09-30 NOTE — OCCUPATIONAL THERAPY INITIAL EVALUATION ADULT - PLANNED THERAPY INTERVENTIONS, OT EVAL
strengthening/ADL retraining/motor coordination training/balance training/neuromuscular re-education/transfer training/bed mobility training

## 2019-09-30 NOTE — OCCUPATIONAL THERAPY INITIAL EVALUATION ADULT - GENERAL OBSERVATIONS, REHAB EVAL
Pt is right hand dominant. Pt's RN Pt is right hand dominant. Pt's RN's Beth aware of intent to eval; cleared Pt. Pt received in chair - +hemovacsx2, heplock. Agreeable to OT. +good affect.

## 2019-09-30 NOTE — OCCUPATIONAL THERAPY INITIAL EVALUATION ADULT - PERSONAL SAFETY AND JUDGMENT, REHAB EVAL
Pt requires cues with education to increase safety awareness and energy cons techs during functional activities

## 2019-09-30 NOTE — OCCUPATIONAL THERAPY INITIAL EVALUATION ADULT - MD ORDER
65 yo woman with PMH of smoking (40 pack yrs), R THR, depression, back pain with onset of 7 years, and perineal pain. She arrives for imaging review and discussion r/b pain management

## 2019-09-30 NOTE — OCCUPATIONAL THERAPY INITIAL EVALUATION ADULT - LEVEL OF INDEPENDENCE: CHAIR TO BED, REHAB EVAL
Spoke to patient and advised that she should be taking Fosamax.  Patient agreed and script sent to pharmacy of choice.    contact guard

## 2019-09-30 NOTE — PROGRESS NOTE ADULT - ASSESSMENT
64F with chronic back pain, s/p R THR, tobacco use, admitted for R L5-S1 TLIF for spondylolisthesis. Med consulted for transient hypoxic episode.     # Hypoxia of unclear etiology  -Pulm on board less likely to be acute COPD exacerbation  -Given lack of focal findings on CXR and no infectious signs less likely to be PNA  -Given improvement with IS likely atelectasis  -Continue OOB and mobilization  -Continue IS x10/h  -Follow Pulm Recs  -O2 supplementation only if patient saturation < 88%    Please reconsult medicine with any further questions 64F with chronic back pain, s/p R THR, tobacco use, admitted for R L5-S1 TLIF for spondylolisthesis. Med consulted for transient hypoxic episode.     # Hypoxia of unclear etiology  -Given lack of focal findings on CXR and no infectious signs less likely to be PNA  -Given improvement with IS likely atelectasis  -Continue OOB and mobilization  -Continue IS   -Follow Pulm Recs      Please reconsult medicine with any further questions

## 2019-09-30 NOTE — OCCUPATIONAL THERAPY INITIAL EVALUATION ADULT - ADDITIONAL COMMENTS
Pt lives with her daughter in 1st floor apt, 3 stairs to enter. Pt denies history of AE/DME use except for grab bar in toilet

## 2019-09-30 NOTE — OCCUPATIONAL THERAPY INITIAL EVALUATION ADULT - MODIFIED CLINICAL TEST OF SENSORY INTEGRATION IN BALANCE TEST
Pt ambulated from her room<>hallway ~30ftx2 with RW support and CS - fairly steady, no LOB/SOB noted

## 2019-10-01 ENCOUNTER — TRANSCRIPTION ENCOUNTER (OUTPATIENT)
Age: 64
End: 2019-10-01

## 2019-10-01 VITALS
OXYGEN SATURATION: 91 % | SYSTOLIC BLOOD PRESSURE: 127 MMHG | DIASTOLIC BLOOD PRESSURE: 58 MMHG | HEART RATE: 107 BPM | RESPIRATION RATE: 16 BRPM | TEMPERATURE: 98 F

## 2019-10-01 PROBLEM — M43.17 SPONDYLOLISTHESIS, LUMBOSACRAL REGION: Chronic | Status: ACTIVE | Noted: 2019-09-26

## 2019-10-01 LAB
ANION GAP SERPL CALC-SCNC: 10 MMOL/L — SIGNIFICANT CHANGE UP (ref 5–17)
BUN SERPL-MCNC: 10 MG/DL — SIGNIFICANT CHANGE UP (ref 7–23)
CALCIUM SERPL-MCNC: 9.2 MG/DL — SIGNIFICANT CHANGE UP (ref 8.4–10.5)
CHLORIDE SERPL-SCNC: 100 MMOL/L — SIGNIFICANT CHANGE UP (ref 96–108)
CO2 SERPL-SCNC: 28 MMOL/L — SIGNIFICANT CHANGE UP (ref 22–31)
CREAT SERPL-MCNC: 0.7 MG/DL — SIGNIFICANT CHANGE UP (ref 0.5–1.3)
GLUCOSE SERPL-MCNC: 121 MG/DL — HIGH (ref 70–99)
HCT VFR BLD CALC: 39.7 % — SIGNIFICANT CHANGE UP (ref 34.5–45)
HGB BLD-MCNC: 12.5 G/DL — SIGNIFICANT CHANGE UP (ref 11.5–15.5)
MCHC RBC-ENTMCNC: 28.4 PG — SIGNIFICANT CHANGE UP (ref 27–34)
MCHC RBC-ENTMCNC: 31.5 GM/DL — LOW (ref 32–36)
MCV RBC AUTO: 90.2 FL — SIGNIFICANT CHANGE UP (ref 80–100)
NRBC # BLD: 0 /100 WBCS — SIGNIFICANT CHANGE UP (ref 0–0)
PLATELET # BLD AUTO: 298 K/UL — SIGNIFICANT CHANGE UP (ref 150–400)
POTASSIUM SERPL-MCNC: 4.4 MMOL/L — SIGNIFICANT CHANGE UP (ref 3.5–5.3)
POTASSIUM SERPL-SCNC: 4.4 MMOL/L — SIGNIFICANT CHANGE UP (ref 3.5–5.3)
RBC # BLD: 4.4 M/UL — SIGNIFICANT CHANGE UP (ref 3.8–5.2)
RBC # FLD: 14.8 % — HIGH (ref 10.3–14.5)
SODIUM SERPL-SCNC: 138 MMOL/L — SIGNIFICANT CHANGE UP (ref 135–145)
WBC # BLD: 11.37 K/UL — HIGH (ref 3.8–10.5)
WBC # FLD AUTO: 11.37 K/UL — HIGH (ref 3.8–10.5)

## 2019-10-01 RX ORDER — DOCUSATE SODIUM 100 MG
1 CAPSULE ORAL
Qty: 0 | Refills: 0 | DISCHARGE
Start: 2019-10-01

## 2019-10-01 RX ORDER — SENNA PLUS 8.6 MG/1
2 TABLET ORAL
Qty: 0 | Refills: 0 | DISCHARGE
Start: 2019-10-01

## 2019-10-01 RX ORDER — DIAZEPAM 5 MG
1 TABLET ORAL
Qty: 30 | Refills: 0
Start: 2019-10-01 | End: 2019-10-10

## 2019-10-01 RX ADMIN — Medication 5 MILLIGRAM(S): at 08:08

## 2019-10-01 RX ADMIN — OXYCODONE AND ACETAMINOPHEN 2 TABLET(S): 5; 325 TABLET ORAL at 09:55

## 2019-10-01 RX ADMIN — OXYCODONE AND ACETAMINOPHEN 2 TABLET(S): 5; 325 TABLET ORAL at 05:42

## 2019-10-01 RX ADMIN — Medication 5 MILLIGRAM(S): at 00:23

## 2019-10-01 RX ADMIN — OXYCODONE AND ACETAMINOPHEN 2 TABLET(S): 5; 325 TABLET ORAL at 10:55

## 2019-10-01 RX ADMIN — GABAPENTIN 400 MILLIGRAM(S): 400 CAPSULE ORAL at 05:42

## 2019-10-01 RX ADMIN — OXYCODONE AND ACETAMINOPHEN 2 TABLET(S): 5; 325 TABLET ORAL at 06:42

## 2019-10-01 NOTE — DISCHARGE NOTE PROVIDER - CARE PROVIDER_API CALL
Moise Brown)  Neurosurgery  General  [address] [facility] [address], [address]  [place] [address]  Phone: [phone]  Fax: [phone]  Follow Up Time:

## 2019-10-01 NOTE — PROGRESS NOTE ADULT - SUBJECTIVE AND OBJECTIVE BOX
NEUROSURGERY POST OP NOTE:    POD# 0 S/P Rt L5-S1 TLIF    S: Patient's history includes PMH of smoking (40 pack yrs), R THR, depression, back pain with onset of 7 years, and perineal pain. Pain management Dr. Dowell. Pain management has included nerve blocks (perineal) with only 1 week relief. Trigger point injections to Lumbar area with no relief    Preop Pain is midline Low back, range 4/10 to 8/10  Aggravated with standing more than 10 minutes, ambulating a few blocks  Relieved with supine, or sitting in recliner    Preop perineal pain was also severe and most bothersome, range 4/10 to 10/10    Postop today in recovery patient is comfortable and denies new numbness or weakness.  Mild back pain.  She is otherwise feeling well.    T(C): 36.6 (09-27-19 @ 06:34), Max: 36.6 (09-27-19 @ 06:34)  HR: 82 (09-27-19 @ 06:34) (82 - 82)  BP: 114/77 (09-27-19 @ 06:34) (114/77 - 114/77)  RR: 18 (09-27-19 @ 06:34) (18 - 18)  SpO2: 95% (09-27-19 @ 06:34) (95% - 95%)    BUpivacaine liposome 1.3% Injectable (no eMAR) 20 milliLiter(s) Local Injection once    RADIOLOGY: No new    Exam: AA&Ox3 in NAD, comfortable in bed  Cor RRR, S1/S2+.  Pulm CTA B/L  Abd soft NT/ND, +BS  Extrem calves soft, NT, no edema  Neuro motor/sens/DTRs intact b/L LE, nonfocal    WOUND/DRAINS: 2 HMV    Assessment: 64yFemale POD# 0 S/P Rt L5-S1 TLIF for spondylolisthesis with back pain and R>L leg pain, doing well:    Plan: - Neuro intact, monitor   - PCA for pain control   - HMV Rt deep, Left Superficial to self-suction, to monitor   - PT tomorrow am   - Diego to d/c in am tomorrow   - Diet advance as tolerated   - Valium for spasm as needed   - SCD's DVT Proph    All above d/w attending Dr. Brown    Assessment:  Present when checked    []  GCS  E   V  M     Heart Failure: []Acute, [] acute on chronic , []chronic  Heart Failure:  [] Diastolic (HFpEF), [] Systolic (HFrEF), []Combined (HFpEF and HFrEF), [] RHF, [] Pulm HTN, [] Other    [] ROBERT, [] ATN, [] AIN, [] other  [] CKD1, [] CKD2, [] CKD 3, [] CKD 4, [] CKD 5, []ESRD    Encephalopathy: [] Metabolic, [] Hepatic, [] toxic, [] Neurological, [] Other    Abnormal Nurtitional Status: [] malnurtition (see nutrition note), [ ]underweight: BMI < 19, [] morbid obesity: BMI >40, [] Cachexia    [] Sepsis  [] hypovolemic shock,[] cardiogenic shock, [] hemorrhagic shock, [] neuogenic shock  [] Acute Respiratory Failure  []Cerebral edema, [] Brain compression/ herniation,   [] Functional quadriplegia  [] Acute blood loss anemia
O/N Events: JULIAN  Subjective/ROS: Patient states that her back feels tight. Denies cough, SOB, KNOWLES. Worked with PT today and stated she tolerated well. Denies HA, CP, SOB, n/v, changes in bowel/urinary habits.  12pt ROS otherwise negative.    VITALS  Vital Signs Last 24 Hrs  T(C): 36.6 (30 Sep 2019 09:13), Max: 37.2 (30 Sep 2019 06:00)  T(F): 97.9 (30 Sep 2019 09:13), Max: 98.9 (30 Sep 2019 06:00)  HR: 100 (30 Sep 2019 09:13) (88 - 100)  BP: 112/74 (30 Sep 2019 09:13) (104/74 - 119/81)  BP(mean): --  RR: 15 (30 Sep 2019 09:13) (15 - 18)  SpO2: 92% (30 Sep 2019 09:13) (92% - 95%)    CAPILLARY BLOOD GLUCOSE    PHYSICAL EXAM  General: A&Ox3; NAD; lying in bed off O2, SpO2 91-93% during this time  Head: NC/AT; MMM; PERRL; EOMI;  Neck: Supple; no JVD  Respiratory: CTA B/L; no wheezes/crackles   Cardiovascular: Regular rhythm/rate; S1/S2   Gastrointestinal: Soft; NTND; normoactive BS  Extremities: WWP; no edema/cyanosis  Neurological:  CNII-XII grossly intact; no obvious focal deficits    MEDICATIONS  (STANDING):  BUpivacaine liposome 1.3% Injectable (no eMAR) 20 milliLiter(s) Local Injection once  diazepam    Tablet 5 milliGRAM(s) Oral every 8 hours  docusate sodium 100 milliGRAM(s) Oral three times a day  enoxaparin Injectable 40 milliGRAM(s) SubCutaneous every 24 hours  gabapentin 400 milliGRAM(s) Oral two times a day  influenza   Vaccine 0.5 milliLiter(s) IntraMuscular once  nicotine -   7 mG/24Hr(s) Patch 1 patch Transdermal daily  senna 2 Tablet(s) Oral at bedtime    MEDICATIONS  (PRN):  ALBUTerol/ipratropium for Nebulization 3 milliLiter(s) Nebulizer every 6 hours PRN Shortness of Breath and/or Wheezing  naloxone Injectable 0.1 milliGRAM(s) IV Push every 3 minutes PRN For ANY of the following changes in patient status:  A. RR LESS THAN 10 breaths per minute, B. Oxygen saturation LESS THAN 90%, C. Sedation score of 6  ondansetron Injectable 4 milliGRAM(s) IV Push every 6 hours PRN Nausea  oxyCODONE    5 mG/acetaminophen 325 mG 1 Tablet(s) Oral every 4 hours PRN Moderate Pain (4 - 6)  oxyCODONE    5 mG/acetaminophen 325 mG 2 Tablet(s) Oral every 4 hours PRN Severe Pain (7 - 10)      No Known Allergies      LABS                        11.9   10.48 )-----------( 259      ( 30 Sep 2019 06:14 )             37.6     09-30    141  |  103  |  12  ----------------------------<  99  4.5   |  30  |  0.68    Ca    8.8      30 Sep 2019 06:14  Phos  3.7     09-30  Mg     2.0     09-30            :
HPI:  Mrs. Villafana is a 63 yo woman with PMH of smoking (40 pack yrs), R THR, depression, back pain with onset of 7 years, and perineal pain. She arrives for imaging review and discussion r/b pain management Dr. Dowell.     Pain is midline Low back   now rated 5/10, range 4/10 to 8/10  Aggravated with standing more than 10 minutes, ambulating a few blocks  Relieved with supine, or sitting in recliner    Perineal pain is severe and most bothersome  now rated 7/10, range 4/10 to 10/10  Pain is constant  No relief    Pain management has included nerve blocks (perineal) with only 1 week relief  Trigger point injections to Lumbar area with no relief    PCP: Parker Hawkins [phone]     Physical Exam  Lumbar/sacral spine examination demonstrates. straight leg raise of the left leg was negative. straight leg raise of the right leg was negative.   Gait: normal the patient was able to toe walk. the patient was able to heel walk.   Motor Exam: all motor groups within normal limits of strength and tone bilaterally.   Sensory Exam: all sensory within normal limits bilaterally.   Deep Tendon Reflexes: all reflexes within normal limits bilaterally. (27 Sep 2019 14:25)    OVERNIGHT EVENTS:  Vital Signs Last 24 Hrs  T(C): 36.6 (30 Sep 2019 09:13), Max: 37.2 (30 Sep 2019 06:00)  T(F): 97.9 (30 Sep 2019 09:13), Max: 98.9 (30 Sep 2019 06:00)  HR: 100 (30 Sep 2019 09:13) (88 - 100)  BP: 112/74 (30 Sep 2019 09:13) (104/74 - 119/81)  BP(mean): --  RR: 15 (30 Sep 2019 09:13) (15 - 18)  SpO2: 92% (30 Sep 2019 09:13) (92% - 95%)    I&O's Summary    29 Sep 2019 07:01  -  30 Sep 2019 07:00  --------------------------------------------------------  IN: 0 mL / OUT: 325 mL / NET: -325 mL    30 Sep 2019 07:01  -  30 Sep 2019 11:08  --------------------------------------------------------  IN: 240 mL / OUT: 300 mL / NET: -60 mL      Hospital Course:   9/27/19: POD# 0 S/P Rt L5-S1 TLIF: Postop today in recovery patient is comfortable and denies new numbness or weakness.  Mild back pain.  She is otherwise feeling well.  9/28 POD#1 Desatting, Pulmonary consulted. Nebulizers and will need PFT as outpt. PT eval no needs  9/29 POD#2 Drains still high output. Still requires supplemental O2. Pain dcontrolled  9/30 unevnetful night   Continue to monitor drain output    Neurological:   Gen: AA&Ox3 in NAD    Motor: Intact 5/5 throughout.    Sens: SILT throughout.  Skin: Warm, dry, no erythema.      Cardiovascular: RRR  Respiratory: Lungs CTAB  Gastrointestinal: +BS  Genitourinary: voiding without difficulty  Extremities: warm and dry  Incision/Wound:  CDI      DIET: Rregular    LABS:                        11.9   10.48 )-----------( 259      ( 30 Sep 2019 06:14 )             37.6     09-30    141  |  103  |  12  ----------------------------<  99  4.5   |  30  |  0.68    Ca    8.8      30 Sep 2019 06:14  Phos  3.7     09-30  Mg     2.0     09-30      CAPILLARY BLOOD GLUCOSE      Drug Levels: [] N/A    CSF Analysis: [] N/A      Allergies    No Known Allergies    Intolerances      MEDICATIONS:  Antibiotics:    Neuro:  diazepam    Tablet 5 milliGRAM(s) Oral every 8 hours  gabapentin 400 milliGRAM(s) Oral two times a day  ondansetron Injectable 4 milliGRAM(s) IV Push every 6 hours PRN  oxyCODONE    5 mG/acetaminophen 325 mG 1 Tablet(s) Oral every 4 hours PRN  oxyCODONE    5 mG/acetaminophen 325 mG 2 Tablet(s) Oral every 4 hours PRN    Anticoagulation:  enoxaparin Injectable 40 milliGRAM(s) SubCutaneous every 24 hours    OTHER:  ALBUTerol/ipratropium for Nebulization 3 milliLiter(s) Nebulizer every 6 hours PRN  BUpivacaine liposome 1.3% Injectable (no eMAR) 20 milliLiter(s) Local Injection once  docusate sodium 100 milliGRAM(s) Oral three times a day  influenza   Vaccine 0.5 milliLiter(s) IntraMuscular once  naloxone Injectable 0.1 milliGRAM(s) IV Push every 3 minutes PRN  nicotine -   7 mG/24Hr(s) Patch 1 patch Transdermal daily  senna 2 Tablet(s) Oral at bedtime    IVF:    CULTURES:    RADIOLOGY & ADDITIONAL TESTS:      ASSESSMENT:  64y Female s/p Rt L5-S1 TLIF,      PLAN:  NEURO:    Monitor neuro status  OT/PT  Monitor drain output  Pain management  Bowel regime  continue current medical regime    Dispo: Discussed with attnding          DVT PROPHYLAXIS:  [x] Venodynes                                [] Heparin/Lovenox    FALL RISK:  [] Low Risk                                    [] Impulsive  Assessment:  Present when checked    []  GCS  E   V  M     Heart Failure: []Acute, [] acute on chronic , []chronic  Heart Failure:  [] Diastolic (HFpEF), [] Systolic (HFrEF), []Combined (HFpEF and HFrEF), [] RHF, [] Pulm HTN, [] Other    [] ROBERT, [] ATN, [] AIN, [] other  [] CKD1, [] CKD2, [] CKD 3, [] CKD 4, [] CKD 5, []ESRD    Encephalopathy: [] Metabolic, [] Hepatic, [] toxic, [] Neurological, [] Other    Abnormal Nurtitional Status: [] malnurtition (see nutrition note), [ ]underweight: BMI < 19, [] morbid obesity: BMI >40, [] Cachexia    [] Sepsis  [] hypovolemic shock,[] cardiogenic shock, [] hemorrhagic shock, [] neuogenic shock  [] Acute Respiratory Failure  []Cerebral edema, [] Brain compression/ herniation,   [] Functional quadriplegia  [] Acute blood loss anemia
HPI:  Mrs. Villafana is a 65 yo woman with PMH of smoking (40 pack yrs), R THR, depression, back pain with onset of 7 years, and perineal pain. She arrives for imaging review and discussion r/b pain management Dr. Dowell.     Pain is midline Low back   now rated 5/10, range 4/10 to 8/10  Aggravated with standing more than 10 minutes, ambulating a few blocks  Relieved with supine, or sitting in recliner    Perineal pain is severe and most bothersome  now rated 7/10, range 4/10 to 10/10  Pain is constant  No relief    Pain management has included nerve blocks (perineal) with only 1 week relief  Trigger point injections to Lumbar area with no relief    PCP: Parker Hawkins [phone]     Physical Exam  Lumbar/sacral spine examination demonstrates. straight leg raise of the left leg was negative. straight leg raise of the right leg was negative.   Gait: normal the patient was able to toe walk. the patient was able to heel walk.   Motor Exam: all motor groups within normal limits of strength and tone bilaterally.   Sensory Exam: all sensory within normal limits bilaterally.   Deep Tendon Reflexes: all reflexes within normal limits bilaterally. (27 Sep 2019 14:25)    OVERNIGHT EVENTS:  No events overnight. Requires supplemental O2. Still c/o R leg pain same as preop    Hospital Course:   9/27/19: POD# 0 S/P Rt L5-S1 TLIF: Postop today in recovery patient is comfortable and denies new numbness or weakness.  Mild back pain.  She is otherwise feeling well.  9/28 POD#1 Desatting, Pulmonary consulted. Nebulizers and will need PFT as outpt. PT eval no needs  9/29 POD#2 Drains still high output. Still requires supplemental O2. Pain dcontrolled    Vital Signs Last 24 Hrs  T(C): 36.7 (29 Sep 2019 15:19), Max: 36.7 (28 Sep 2019 20:53)  T(F): 98 (29 Sep 2019 15:19), Max: 98 (28 Sep 2019 20:53)  HR: 93 (29 Sep 2019 15:19) (79 - 93)  BP: 104/74 (29 Sep 2019 15:19) (104/69 - 128/80)  BP(mean): --  RR: 16 (29 Sep 2019 15:19) (16 - 17)  SpO2: 95% (29 Sep 2019 15:19) (91% - 95%)    I&O's Summary    28 Sep 2019 07:01  -  29 Sep 2019 07:00  --------------------------------------------------------  IN: 0 mL / OUT: 325 mL / NET: -325 mL    29 Sep 2019 07:01  -  29 Sep 2019 17:34  --------------------------------------------------------  IN: 0 mL / OUT: 115 mL / NET: -115 mL        PHYSICAL EXAM:  Neurological:  onstitutional:  63 y/o female awake, alert in no acute distress.  Eyes:  Sclera anicteric, conjunctiva noninjected.  ENMT: Oropharyngeal mucosa moist, pink. Tongue midline.    Neck: Neck supple, FROM.  No appreciable lymphadenopathy.  Back:  No pain to palpation/percussion of low back. No CVA tenderness.  Respiratory: Clear to auscultation bilaterally.  No rales, rhonchi, wheezes.  Cardiovascular: Regular rate and rhythm.  S1, S2 heard.  Gastrointestinal:  Soft, nontender, nondistended.  +BS.  Genitourinary:  Deferred.  Rectal: Deferred.  Vascular: Extremities warm, no ulcers, no discoloration of skin.   Neurological: Gen: AA&Ox3 in NAD    Motor: Intact 5/5 throughout.    Sens: SILT throughout.  Skin: Warm, dry, no erythema.  Calves soft, nontender.    Incision/Wound: Incision has dressing that is clean, dry and intact.  2 HMV with sanguinous drainage, intact.    TUBES/LINES:  [] Diego  [] Lumbar Drain  [] Wound Drains  [] Others      DIET:  [] NPO  [x] Mechanical  [] Tube feeds    LABS:                        11.5   14.89 )-----------( 249      ( 29 Sep 2019 07:38 )             37.2     09-29    141  |  103  |  14  ----------------------------<  109<H>  4.3   |  30  |  0.66    Ca    9.0      29 Sep 2019 07:38  Phos  3.7     09-28  Mg     2.0     09-28              CAPILLARY BLOOD GLUCOSE      POCT Blood Glucose.: 124 mg/dL (29 Sep 2019 06:48)      Drug Levels: [] N/A    CSF Analysis: [] N/A      Allergies    No Known Allergies    Intolerances      MEDICATIONS:  Antibiotics:    Neuro:  diazepam    Tablet 5 milliGRAM(s) Oral every 8 hours  gabapentin 400 milliGRAM(s) Oral two times a day  ondansetron Injectable 4 milliGRAM(s) IV Push every 6 hours PRN  oxyCODONE    5 mG/acetaminophen 325 mG 1 Tablet(s) Oral every 4 hours PRN  oxyCODONE    5 mG/acetaminophen 325 mG 2 Tablet(s) Oral every 4 hours PRN    Anticoagulation:  enoxaparin Injectable 40 milliGRAM(s) SubCutaneous every 24 hours    OTHER:  ALBUTerol/ipratropium for Nebulization 3 milliLiter(s) Nebulizer every 6 hours PRN  BUpivacaine liposome 1.3% Injectable (no eMAR) 20 milliLiter(s) Local Injection once  docusate sodium 100 milliGRAM(s) Oral three times a day  influenza   Vaccine 0.5 milliLiter(s) IntraMuscular once  naloxone Injectable 0.1 milliGRAM(s) IV Push every 3 minutes PRN  nicotine -   7 mG/24Hr(s) Patch 1 patch Transdermal daily  senna 2 Tablet(s) Oral at bedtime    IVF:    CULTURES:    RADIOLOGY & ADDITIONAL TESTS:      ASSESSMENT:  64y Female s/p Rt L5-S1 TLIF, doing well neurologically.  POD#2    M43.10  Handoff  MEWS Score  Spondylolisthesis, lumbosacral region  Spondylolisthesis at L5-S1 level  Spondylolisthesis at L5-S1 level  Smoker  Atelectasis  Respiratory failure  Transforaminal lumbar interbody fusion  History of total hip replacement, right  Elective surgery      PLAN:  NEURO:  NEURO: - neuro checks   - decadron 4q6 x 4 doses then d/c   - continue HMV drains through Monday   - d/c PCA, start po pain meds    CARDIOVASCULAR/PULM:  - hypoxia without, requiring 6L on room air for 92% SPO2, but there is no SOB or chest pain  - Medicine consulted, recs appreciated, possible undiagnosed COPD  - Pulm consulted, awaiting recs    RENAL: - no issues    GI: continue diet   - protonix x 1 day while on dex    HEME: - H/H stable, repeat labs in am    ID: - no issues    ENDO: - no issues    DVT PROPHYLAXIS:  [x] Venodynes                                [x] Heparin/Lovenox      DISPOSITION: home once drains out
HPI:  Mrs. Villafana is a 65 yo woman with PMH of smoking (40 pack yrs), R THR, depression, back pain with onset of 7 years, and perineal pain. She arrives for imaging review and discussion r/b pain management Dr. Dowell.     Pain is midline Low back   now rated 5/10, range 4/10 to 8/10  Aggravated with standing more than 10 minutes, ambulating a few blocks  Relieved with supine, or sitting in recliner    Perineal pain is severe and most bothersome  now rated 7/10, range 4/10 to 10/10  Pain is constant  No relief    Pain management has included nerve blocks (perineal) with only 1 week relief  Trigger point injections to Lumbar area with no relief    PCP: Parker Hawkins [phone]     Physical Exam  Lumbar/sacral spine examination demonstrates. straight leg raise of the left leg was negative. straight leg raise of the right leg was negative.   Gait: normal the patient was able to toe walk. the patient was able to heel walk.   Motor Exam: all motor groups within normal limits of strength and tone bilaterally.   Sensory Exam: all sensory within normal limits bilaterally.   Deep Tendon Reflexes: all reflexes within normal limits bilaterally. (27 Sep 2019 14:25)    OVERNIGHT EVENTS: No events overnight    Hospital Course:   9/27/19: POD# 0 S/P Rt L5-S1 TLIF: Postop today in recovery patient is comfortable and denies new numbness or weakness.  Mild back pain.  She is otherwise feeling well.  9/28 POD#1 Desatting, Pulmonary consulted. Nebulizers and will need PFT as outpt. PT eval no needs  9/29 POD#2 Drains still high output. Still requires supplemental O2. Pain dcontrolled  9//30 POD#3 Drain A removed    Vital Signs Last 24 Hrs  T(C): 36.6 (30 Sep 2019 20:34), Max: 37.2 (30 Sep 2019 06:00)  T(F): 97.9 (30 Sep 2019 20:34), Max: 98.9 (30 Sep 2019 06:00)  HR: 92 (30 Sep 2019 20:34) (92 - 100)  BP: 112/79 (30 Sep 2019 20:34) (108/71 - 119/81)  BP(mean): --  RR: 17 (30 Sep 2019 20:34) (15 - 18)  SpO2: 92% (30 Sep 2019 20:34) (92% - 92%)    I&O's Summary    29 Sep 2019 07:01  -  30 Sep 2019 07:00  --------------------------------------------------------  IN: 0 mL / OUT: 325 mL / NET: -325 mL    30 Sep 2019 07:01  -  01 Oct 2019 01:23  --------------------------------------------------------  IN: 720 mL / OUT: 735 mL / NET: -15 mL        PHYSICAL EXAM:  Neurological:  5/5 motor x4ext  sensory intact  Incision/Wound: C/D/I    TUBES/LINES:  [] Diego  [] Lumbar Drain  [] Wound Drains  [] Others      DIET:  [] NPO  [x] Mechanical  [] Tube feeds    LABS:                        11.9   10.48 )-----------( 259      ( 30 Sep 2019 06:14 )             37.6     09-30    141  |  103  |  12  ----------------------------<  99  4.5   |  30  |  0.68    Ca    8.8      30 Sep 2019 06:14  Phos  3.7     09-30  Mg     2.0     09-30              CAPILLARY BLOOD GLUCOSE          Drug Levels: [] N/A    CSF Analysis: [] N/A      Allergies    No Known Allergies    Intolerances      MEDICATIONS:  Antibiotics:    Neuro:  diazepam    Tablet 5 milliGRAM(s) Oral every 8 hours  gabapentin 400 milliGRAM(s) Oral two times a day  ondansetron Injectable 4 milliGRAM(s) IV Push every 6 hours PRN  oxyCODONE    5 mG/acetaminophen 325 mG 1 Tablet(s) Oral every 4 hours PRN  oxyCODONE    5 mG/acetaminophen 325 mG 2 Tablet(s) Oral every 4 hours PRN    Anticoagulation:  enoxaparin Injectable 40 milliGRAM(s) SubCutaneous every 24 hours    OTHER:  ALBUTerol/ipratropium for Nebulization 3 milliLiter(s) Nebulizer every 6 hours PRN  BUpivacaine liposome 1.3% Injectable (no eMAR) 20 milliLiter(s) Local Injection once  docusate sodium 100 milliGRAM(s) Oral three times a day  influenza   Vaccine 0.5 milliLiter(s) IntraMuscular once  naloxone Injectable 0.1 milliGRAM(s) IV Push every 3 minutes PRN  nicotine -   7 mG/24Hr(s) Patch 1 patch Transdermal daily  senna 2 Tablet(s) Oral at bedtime    IVF:    CULTURES:    RADIOLOGY & ADDITIONAL TESTS:      ASSESSMENT:  64y Female s/p Rt L5-S1 TLIF, doing well neurologically.  POD#2    M43.10  Handoff  MEWS Score  Spondylolisthesis, lumbosacral region  Spondylolisthesis at L5-S1 level  Spondylolisthesis at L5-S1 level  Smoker  Atelectasis  Respiratory failure  Transforaminal lumbar interbody fusion  History of total hip replacement, right  Elective surgery      PLAN:  NEURO:  NEURO: - neuro checks   - F/u Hmv output. Hemovac A removed.   - po pain meds    CARDIOVASCULAR/PULM:  - hypoxia without, requiring 6L on room air for 92% SPO2, but there is no SOB or chest pain  - Medicine consulted, recs appreciated, possible undiagnosed COPD  - Pulm consulted, awaiting recs    RENAL: - no issues    GI: continue diet   - protonix x 1 day while on dex    HEME: - H/H stable, repeat labs in am    ID: - no issues    ENDO: - no issues    DVT PROPHYLAXIS:  [x] Venodynes                                [x] Heparin/Lovenox      DISPOSITION: home once drains out  DVT PROPHYLAXIS:  [] Venodynes                                [] Heparin/Lovenox    DISPOSITION:
HPI:  Patient's history includes PMH of smoking (40 pack yrs), R THR, depression, back pain with onset of 7 years, and perineal pain. Pain management Dr. Dowell. Pain management has included nerve blocks (perineal) with only 1 week relief. Trigger point injections to Lumbar area with no relief    Preop Pain is midline Low back, range 4/10 to 8/10  Aggravated with standing more than 10 minutes, ambulating a few blocks  Relieved with supine, or sitting in recliner    Preop perineal pain was also severe and most bothersome, range 4/10 to 10/10    Hospital Course:   19: POD# 0 S/P Rt L5-S1 TLIF: Postop today in recovery patient is comfortable and denies new numbness or weakness.  Mild back pain.  She is otherwise feeling well.    19: POD#1: No overnight events.  Reports preop pain is gone.  Mild LBP postop.  Denies new numbness or weakness. Denies SOB, CP, N/V, calf pain.    Vital Signs Last 24 Hrs  T(C): 36.7 (28 Sep 2019 08:33), Max: 36.7 (28 Sep 2019 05:00)  T(F): 98 (28 Sep 2019 08:33), Max: 98 (28 Sep 2019 05:00)  HR: 104 (28 Sep 2019 12:30) (72 - 104)  BP: 100/64 (28 Sep 2019 12:30) (90/55 - 117/73)  BP(mean): 66 (28 Sep 2019 05:00) (56 - 88)  RR: 15 (28 Sep 2019 08:33) (10 - 23)  SpO2: 93% (28 Sep 2019 12:30) (77% - 100%)    I&O's Summary    27 Sep 2019 07:  -  28 Sep 2019 07:00  --------------------------------------------------------  IN: 630 mL / OUT: 1852 mL / NET: -1222 mL    28 Sep 2019 07:  -  28 Sep 2019 14:49  --------------------------------------------------------  IN: 0 mL / OUT: 135 mL / NET: -135 mL    PHYSICAL EXAM:  Constitutional:  63 y/o female awake, alert in no acute distress.  Eyes:  Sclera anicteric, conjunctiva noninjected.  ENMT: Oropharyngeal mucosa moist, pink. Tongue midline.    Neck: Neck supple, FROM.  No appreciable lymphadenopathy.  Back:  No pain to palpation/percussion of low back. No CVA tenderness.  Respiratory: Clear to auscultation bilaterally.  No rales, rhonchi, wheezes.  Cardiovascular: Regular rate and rhythm.  S1, S2 heard.  Gastrointestinal:  Soft, nontender, nondistended.  +BS.  Genitourinary:  Deferred.  Rectal: Deferred.  Vascular: Extremities warm, no ulcers, no discoloration of skin.   Neurological: Gen: AA&Ox3 in NAD    Motor: Intact 5/5 throughout.    Sens: SILT throughout.  Skin: Warm, dry, no erythema.  Calves soft, nontender.    Incision/Wound: Incision has dressing that is clean, dry and intact.  2 HMV with sanguinous drainage, intact.    DEVICE/DRAIN DRESSIN HMV, ~50 cc each overnight. Rt= deep, L = superficial.    DIET:  [] NPO  [x] Mechanical  [] Tube feeds    LABS:   11.8   13.93 )-----------( 246      ( 28 Sep 2019 07:26 )             36.3         140  |  101  |  10  ----------------------------<  147<H>  4.6   |  31  |  0.65    Ca    9.2      28 Sep 2019 07:26  Phos  3.7       Mg     2.0         Imaging: < from: Xray Chest 1 View- PORTABLE-Urgent (19 @ 13:03) >  Impression: Bilateral discoid changes lung bases    < end of copied text >      Allergies: No Known Allergies    Intolerances    MEDICATIONS:  Antibiotics: None    Neuro:  diazepam    Tablet 5 milliGRAM(s) Oral every 8 hours  gabapentin 400 milliGRAM(s) Oral two times a day  ondansetron Injectable 4 milliGRAM(s) IV Push every 6 hours PRN  oxyCODONE    5 mG/acetaminophen 325 mG 1 Tablet(s) Oral every 4 hours PRN  oxyCODONE    5 mG/acetaminophen 325 mG 2 Tablet(s) Oral every 4 hours PRN    Anticoagulation:  enoxaparin Injectable 40 milliGRAM(s) SubCutaneous every 24 hours    OTHER:  BUpivacaine liposome 1.3% Injectable (no eMAR) 20 milliLiter(s) Local Injection once  docusate sodium 100 milliGRAM(s) Oral three times a day  influenza   Vaccine 0.5 milliLiter(s) IntraMuscular once  naloxone Injectable 0.1 milliGRAM(s) IV Push every 3 minutes PRN  nicotine -   7 mG/24Hr(s) Patch 1 patch Transdermal daily  senna 2 Tablet(s) Oral at bedtime    ASSESSMENT:  64y Female s/p Rt L5-S1 TLIF, doing well neurologically.      M43.10  Handoff  MEWS Score  Spondylolisthesis, lumbosacral region  Spondylolisthesis at L5-S1 level  Spondylolisthesis at L5-S1 level  Transforaminal lumbar interbody fusion  History of total hip replacement, right  Elective surgery    PLAN:  NEURO: - neuro checks   - decadron 4q6 x 4 doses then d/c   - continue HMV drains through Monday   - d/c PCA, start po pain meds    CARDIOVASCULAR/PULM:  - hypoxia without, requiring 6L on room air for 92% SPO2, but there is no SOB or chest pain  - Medicine consulted, recs appreciated, possible undiagnosed COPD  - Pulm consulted, awaiting recs    RENAL: - no issues    GI: continue diet   - protonix x 1 day while on dex    HEME: - H/H stable, repeat labs in am    ID: - no issues    ENDO: - no issues    DVT PROPHYLAXIS:  [x] Venodynes                                [x] Heparin/Lovenox    FALL RISK:  [] Low Risk                                    [] Impulsive    DISPOSITION: plan for d/c home Monday, await PT recs.    All above D/W Dr. Brown, neurosurgery attending.    Assessment:  Present when checked    []  GCS  E   V  M     Heart Failure: []Acute, [] acute on chronic , []chronic  Heart Failure:  [] Diastolic (HFpEF), [] Systolic (HFrEF), []Combined (HFpEF and HFrEF), [] RHF, [] Pulm HTN, [] Other    [] ROBERT, [] ATN, [] AIN, [] other  [] CKD1, [] CKD2, [] CKD 3, [] CKD 4, [] CKD 5, []ESRD    Encephalopathy: [] Metabolic, [] Hepatic, [] toxic, [] Neurological, [] Other    Abnormal Nurtitional Status: [] malnurtition (see nutrition note), [ ]underweight: BMI < 19, [] morbid obesity: BMI >40, [] Cachexia    [] Sepsis  [] hypovolemic shock,[] cardiogenic shock, [] hemorrhagic shock, [] neuogenic shock  [] Acute Respiratory Failure  []Cerebral edema, [] Brain compression/ herniation,   [] Functional quadriplegia  [] Acute blood loss anemia
Patient is a 64y old  Female who presents with a chief complaint of Spinal Fusion. (28 Sep 2019 20:57)    INTERVAL HPI/OVERNIGHT EVENTS: JULIAN overnight. Patient states that she ambulated with PT this AM and stated that her SPO2 improved with ambulation. Denies CP, SOB, abdominal pain, nausea, vomiting, melena, hematochezia.     Review of Systems: 12 point review of systems otherwise negative    MEDICATIONS  (STANDING):  BUpivacaine liposome 1.3% Injectable (no eMAR) 20 milliLiter(s) Local Injection once  diazepam    Tablet 5 milliGRAM(s) Oral every 8 hours  docusate sodium 100 milliGRAM(s) Oral three times a day  enoxaparin Injectable 40 milliGRAM(s) SubCutaneous every 24 hours  gabapentin 400 milliGRAM(s) Oral two times a day  influenza   Vaccine 0.5 milliLiter(s) IntraMuscular once  nicotine -   7 mG/24Hr(s) Patch 1 patch Transdermal daily  senna 2 Tablet(s) Oral at bedtime    MEDICATIONS  (PRN):  ALBUTerol/ipratropium for Nebulization 3 milliLiter(s) Nebulizer every 6 hours PRN Shortness of Breath and/or Wheezing  naloxone Injectable 0.1 milliGRAM(s) IV Push every 3 minutes PRN For ANY of the following changes in patient status:  A. RR LESS THAN 10 breaths per minute, B. Oxygen saturation LESS THAN 90%, C. Sedation score of 6  ondansetron Injectable 4 milliGRAM(s) IV Push every 6 hours PRN Nausea  oxyCODONE    5 mG/acetaminophen 325 mG 1 Tablet(s) Oral every 4 hours PRN Moderate Pain (4 - 6)  oxyCODONE    5 mG/acetaminophen 325 mG 2 Tablet(s) Oral every 4 hours PRN Severe Pain (7 - 10)    Allergies    No Known Allergies    Intolerances    Vital Signs Last 24 Hrs  T(C): 36.7 (29 Sep 2019 15:19), Max: 36.7 (28 Sep 2019 20:53)  T(F): 98 (29 Sep 2019 15:19), Max: 98 (28 Sep 2019 20:53)  HR: 93 (29 Sep 2019 15:19) (75 - 93)  BP: 104/74 (29 Sep 2019 15:19) (104/69 - 128/80)  BP(mean): --  RR: 16 (29 Sep 2019 15:19) (16 - 17)  SpO2: 95% (29 Sep 2019 15:19) (91% - 95%)  CAPILLARY BLOOD GLUCOSE    POCT Blood Glucose.: 124 mg/dL (29 Sep 2019 06:48)      09-28 @ 07:01 - 09-29 @ 07:00  --------------------------------------------------------  IN: 0 mL / OUT: 325 mL / NET: -325 mL    09-29 @ 07:01 - 09-29 @ 15:39  --------------------------------------------------------  IN: 0 mL / OUT: 115 mL / NET: -115 mL    Physical Exam:    General:  NAD on 6L NC  HEENT:  Nonicteric, PERRLA  CV:  RRR, no murmur, no JVD  Lungs:  CTA B/L, no wheezes, rales, rhonchi  Abdomen:  Soft, non-tender, no distended, positive BS, no hepatosplenomegaly  Extremities:  2+ pulses, no c/c, no edema  Skin:  Warm and dry, no rashes  Neuro:  AAOx3, non-focal  No Restraints    LABS:                        11.5   14.89 )-----------( 249      ( 29 Sep 2019 07:38 )             37.2     09-29    141  |  103  |  14  ----------------------------<  109<H>  4.3   |  30  |  0.66    Ca    9.0      29 Sep 2019 07:38  Phos  3.7     09-28  Mg     2.0     09-28    RADIOLOGY & ADDITIONAL TESTS: No new radiologic studies

## 2019-10-01 NOTE — DISCHARGE NOTE PROVIDER - HOSPITAL COURSE
Hospital Course:     9/27/19: POD# 0 S/P Rt L5-S1 TLIF: Postop today in recovery patient is comfortable and denies new numbness or weakness.  Mild back pain.  She is otherwise feeling well.    9/28 POD#1 low oxygen saturation, Pulmonary consulted. Nebulizers and will need PFT as out patient PT eval no needs    9/29 POD#2 Drains still high output. Still requires supplemental O2. Pain controlled.    9//30 POD#3 Drain A removed. Pulmonary state improved. No longer needs supplemental oxygen.    10/01: POD#4 Second drain removed. no respiratory distress. Patient is clear for discharge home today. she ambulates without assistance and tolerates PO intake. Hospital Course:     9/27/19: POD# 0 S/P Rt L5-S1 TLIF: Postop today in recovery patient is comfortable and denies new numbness or weakness.  Mild back pain.  She is otherwise feeling well.    9/28 POD#1 low oxygen saturation, Pulmonary consulted. Nebulizers and will need PFT as out patient PT eval no needs    9/29 POD#2 Drains still high output. Still requires supplemental O2. Pain controlled.    9//30 POD#3 Drain A removed. Pulmonary state improved. No longer needs supplemental oxygen.    10/01: POD#4 Second drain removed. no respiratory distress. Patient is clear for discharge home today. she ambulates without assistance and tolerates PO intake.             Post-operative hypoxemia was related to pre-existing COPD present on admission and represented an acute on chronic exacerbation of respiratory distress.

## 2019-10-01 NOTE — DISCHARGE NOTE NURSING/CASE MANAGEMENT/SOCIAL WORK - PATIENT PORTAL LINK FT
You can access the FollowMyHealth Patient Portal offered by [facility] by registering at the following website: [email or web address]. By joining [facility]’s FollowMyHealth portal, you will also be able to view your health information using other applications (apps) compatible with our system.

## 2019-10-01 NOTE — DISCHARGE NOTE PROVIDER - NSDCCPCAREPLAN_GEN_ALL_CORE_FT
PRINCIPAL DISCHARGE DIAGNOSIS  Diagnosis: Neuroforaminal stenosis of lumbar spine  Assessment and Plan of Treatment: Patient is status post day 4 L5-S1 right laminectomy and TLIF.   you have midline surgical stalpes at lower back and 2 staples at right butucks. They will be removed in the office. You may resume normal home diet, and medications. Take stool softner and laxative to prevent constipation and to keep the stool soft. Do not lift weight. you may shower. No bath.  Keep wound dry and clean.

## 2019-10-11 ENCOUNTER — APPOINTMENT (OUTPATIENT)
Dept: NEUROSURGERY | Facility: CLINIC | Age: 64
End: 2019-10-11
Payer: COMMERCIAL

## 2019-10-11 VITALS
BODY MASS INDEX: 33.63 KG/M2 | WEIGHT: 197 LBS | RESPIRATION RATE: 18 BRPM | OXYGEN SATURATION: 91 % | HEART RATE: 104 BPM | SYSTOLIC BLOOD PRESSURE: 114 MMHG | DIASTOLIC BLOOD PRESSURE: 78 MMHG | TEMPERATURE: 98 F | HEIGHT: 64 IN

## 2019-10-11 DIAGNOSIS — M43.10 SPONDYLOLISTHESIS, SITE UNSPECIFIED: ICD-10-CM

## 2019-10-11 DIAGNOSIS — F17.200 NICOTINE DEPENDENCE, UNSPECIFIED, UNCOMPLICATED: ICD-10-CM

## 2019-10-11 DIAGNOSIS — M54.5 LOW BACK PAIN: ICD-10-CM

## 2019-10-11 DIAGNOSIS — G89.29 LOW BACK PAIN: ICD-10-CM

## 2019-10-11 DIAGNOSIS — Z98.1 ARTHRODESIS STATUS: ICD-10-CM

## 2019-10-11 DIAGNOSIS — R10.2 PELVIC AND PERINEAL PAIN: ICD-10-CM

## 2019-10-11 PROCEDURE — 99024 POSTOP FOLLOW-UP VISIT: CPT

## 2019-10-31 PROCEDURE — 86900 BLOOD TYPING SEROLOGIC ABO: CPT

## 2019-10-31 PROCEDURE — 97161 PT EVAL LOW COMPLEX 20 MIN: CPT

## 2019-10-31 PROCEDURE — 84100 ASSAY OF PHOSPHORUS: CPT

## 2019-10-31 PROCEDURE — 86901 BLOOD TYPING SEROLOGIC RH(D): CPT

## 2019-10-31 PROCEDURE — 71045 X-RAY EXAM CHEST 1 VIEW: CPT

## 2019-10-31 PROCEDURE — 83735 ASSAY OF MAGNESIUM: CPT

## 2019-10-31 PROCEDURE — 85027 COMPLETE CBC AUTOMATED: CPT

## 2019-10-31 PROCEDURE — P9045: CPT

## 2019-10-31 PROCEDURE — 80048 BASIC METABOLIC PNL TOTAL CA: CPT

## 2019-10-31 PROCEDURE — C1713: CPT

## 2019-10-31 PROCEDURE — 36415 COLL VENOUS BLD VENIPUNCTURE: CPT

## 2019-10-31 PROCEDURE — 82330 ASSAY OF CALCIUM: CPT

## 2019-10-31 PROCEDURE — 85018 HEMOGLOBIN: CPT

## 2019-10-31 PROCEDURE — 84132 ASSAY OF SERUM POTASSIUM: CPT

## 2019-10-31 PROCEDURE — 93005 ELECTROCARDIOGRAM TRACING: CPT

## 2019-10-31 PROCEDURE — 86850 RBC ANTIBODY SCREEN: CPT

## 2019-10-31 PROCEDURE — 76000 FLUOROSCOPY <1 HR PHYS/QHP: CPT

## 2019-10-31 PROCEDURE — 85025 COMPLETE CBC W/AUTO DIFF WBC: CPT

## 2019-10-31 PROCEDURE — 84295 ASSAY OF SERUM SODIUM: CPT

## 2019-10-31 PROCEDURE — 82962 GLUCOSE BLOOD TEST: CPT

## 2019-10-31 PROCEDURE — C1889: CPT

## 2019-10-31 PROCEDURE — 95940 IONM IN OPERATNG ROOM 15 MIN: CPT

## 2019-10-31 PROCEDURE — 97116 GAIT TRAINING THERAPY: CPT

## 2019-11-01 NOTE — HISTORY OF PRESENT ILLNESS
[FreeTextEntry1] : Mrs. Villafana is a 63 yo woman with PMH of smoking (40 pack yrs), R THR, depression, back pain, and perineal pain s/p 9/27/19 L5-S1 TLIF who arrives for an initial post op visit.  She reports resolution of pre operative back and groin pain and is very pleased.  The surgical incision is clean, dry, well approximated, no signs of irritation, erythema, warmth, or drainage with staples intact.\par \par Hx:\par r/b pain management Dr. Dowell.  \par Pre op Pain is midline Low back \par now rated 5/10, range 4/10 to 8/10\par Aggravated with standing more than 10 minutes, ambulating a few blocks\par Relieved with supine, or sitting in recliner\par \par Perineal pain is severe and most bothersome\par now rated 7/10, range 4/10 to 10/10\par Pain is constant\par No relief\par \par Pain management has included nerve blocks (perineal) with only 1 week relief\par Trigger point injections to Lumbar area with no relief\par \par PCP:  Parker Hawkins\par [phone]

## 2019-11-01 NOTE — ASSESSMENT
[FreeTextEntry1] : Mrs. Villafana is recovering well, staples removed today, surgical incision remains well approximated with no signs of irritation, erythema, edema, warmth, or drainage.  Reviewed standard post op wound care instructions and restrictions.  Pre op pain has resolved and she is ambulating with no issues.  We will reassess in 6 weeks with xrays at that time.\par \par 1)  Xrays Lumbar A/P lat - 6 weeks\par 2)  RTO 6 weeks

## 2019-11-01 NOTE — REVIEW OF SYSTEMS
[Negative] : Respiratory [As Noted in HPI] : as noted in HPI [Abdominal Pain] : no abdominal pain [Vomiting] : no vomiting [Diarrhea] : no diarrhea [Dysuria] : no dysuria [Incontinence] : no incontinence [de-identified] : ambulates with a cane, pre op pain has resolved

## 2019-11-01 NOTE — PHYSICAL EXAM
[General Appearance - Alert] : alert [General Appearance - In No Acute Distress] : in no acute distress [General Appearance - Well Nourished] : well nourished [General Appearance - Well Developed] : well developed [Oriented To Time, Place, And Person] : oriented to person, place, and time [Impaired Insight] : insight and judgment were intact [Affect] : the affect was normal [Mood] : the mood was normal [Cranial Nerves Optic (II)] : visual acuity intact bilaterally,  pupils equal round and reactive to light [Cranial Nerves Oculomotor (III)] : extraocular motion intact [Cranial Nerves Trigeminal (V)] : facial sensation intact symmetrically [Cranial Nerves Facial (VII)] : face symmetrical [Cranial Nerves Vestibulocochlear (VIII)] : hearing was intact bilaterally [Cranial Nerves Glossopharyngeal (IX)] : tongue and palate midline [Cranial Nerves Accessory (XI - Cranial And Spinal)] : head turning and shoulder shrug symmetric [Cranial Nerves Hypoglossal (XII)] : there was no tongue deviation with protrusion [Motor Strength] : muscle strength was normal in all four extremities [Sensation Tactile Decrease] : light touch was intact [Normal] : normal [Able to toe walk] : the patient was able to toe walk [Able to heel walk] : the patient was able to heel walk [PERRL With Normal Accommodation] : pupils were equal in size, round, reactive to light, with normal accommodation [Hearing Threshold Finger Rub Not [place]] : hearing was normal [Neck Appearance] : the appearance of the neck was normal [] : no respiratory distress [Respiration, Rhythm And Depth] : normal respiratory rhythm and effort [Full Pulse] : the pedal pulses are present [Abnormal Walk] : normal gait [Involuntary Movements] : no involuntary movements were seen [Motor Tone] : muscle strength and tone were normal [Skin Color & Pigmentation] : normal skin color and pigmentation [Clean] : clean [Dry] : dry [Healing Well] : healing well [Intact] : intact [Staple Intact] : closed with intact staples [No Drainage] : without drainage [Normal Skin] : normal [Erythema] : not erythematous [Warm] : not warm [Romberg's Sign] : Romberg's sign was negtive [Limited Balance] : balance was intact [Tremor] : no tremor present [Straight-Leg Raise Test - Left] : straight leg raise of the left leg was negative [Straight-Leg Raise Test - Right] : straight leg raise  of the right leg was negative [FreeTextEntry1] : mild distal edema BLE - + PP, warm, pink

## 2019-12-06 ENCOUNTER — APPOINTMENT (OUTPATIENT)
Dept: NEUROSURGERY | Facility: CLINIC | Age: 64
End: 2019-12-06
Payer: COMMERCIAL

## 2019-12-06 VITALS
BODY MASS INDEX: 32.82 KG/M2 | DIASTOLIC BLOOD PRESSURE: 80 MMHG | HEIGHT: 65 IN | OXYGEN SATURATION: 97 % | RESPIRATION RATE: 16 BRPM | SYSTOLIC BLOOD PRESSURE: 115 MMHG | WEIGHT: 197 LBS | HEART RATE: 101 BPM

## 2019-12-06 PROCEDURE — 99024 POSTOP FOLLOW-UP VISIT: CPT

## 2019-12-06 RX ORDER — BUPROPION HYDROCHLORIDE 75 MG/1
TABLET, FILM COATED ORAL
Refills: 0 | Status: ACTIVE | COMMUNITY

## 2019-12-06 RX ORDER — GABAPENTIN 300 MG/1
300 CAPSULE ORAL
Refills: 0 | Status: ACTIVE | COMMUNITY

## 2019-12-06 RX ORDER — DIAZEPAM 5 MG/1
5 TABLET ORAL
Refills: 0 | Status: ACTIVE | COMMUNITY

## 2019-12-06 RX ORDER — HYDROCODONE BITARTRATE AND ACETAMINOPHEN 7.5; 3 MG/1; MG/1
TABLET ORAL
Refills: 0 | Status: ACTIVE | COMMUNITY

## 2019-12-06 NOTE — HISTORY OF PRESENT ILLNESS
[FreeTextEntry1] : Mrs. Villafana is a 65 yo woman with PMH of smoking (40 pack yrs), R THR, depression, back pain, and perineal pain s/p 9/27/19 L5-S1 TLIF who arrives for an initial post op visit.  She reports resolution of pre operative back and groin pain and is very pleased.  The surgical incision is well healed.\par She had some return of perineal pain in mid October which has been relieved with restarting diazepam. Currently she has intermittent mild perineal pain which is tolerable and does not affect her daily life. She denies any bowel/bladder incontinence or retention. \par \par Hx:\par r/b pain management Dr. Dowell.  \par Pre op Pain is midline Low back \par now rated 5/10, range 4/10 to 8/10\par Aggravated with standing more than 10 minutes, ambulating a few blocks\par Relieved with supine, or sitting in recliner\par \par Perineal pain is severe and most bothersome\par now rated 7/10, range 4/10 to 10/10\par Pain is constant\par No relief\par \par Pain management has included nerve blocks (perineal) with only 1 week relief\par Trigger point injections to Lumbar area with no relief\par \par PCP:  Parker Hawkins\par [phone]

## 2019-12-06 NOTE — ASSESSMENT
[FreeTextEntry1] : Mrs. Villafana is recovering well, surgical incision remains well approximated with no signs of irritation, erythema, edema, warmth, or drainage.  Reviewed standard post op restrictions.  Pre op pain has improved and she is ambulating with no issues.  We will reassess in 6 months with xrays at that time. SHe can start PT 1/1/20.\par \par 1)  Xrays Lumbar A/P lat - 6 months\par 2)  RTO 6 months

## 2019-12-06 NOTE — REASON FOR VISIT
[Follow-Up: _____] : a [unfilled] follow-up visit [de-identified] : s/p 9/27/19 L5-S1 TLIF at [facility]

## 2021-11-09 NOTE — BRIEF OPERATIVE NOTE - NSICDXBRIEFPROCEDURE_GEN_ALL_CORE_FT
Spoke to Dulce Smith from [facility] and tadalafil is still in review.  It takes 7-15 business days PROCEDURES:  Transforaminal lumbar interbody fusion 27-Sep-2019 14:21:36  Christin Murillo

## 2022-01-22 NOTE — PHYSICAL THERAPY INITIAL EVALUATION ADULT - NS ASR RISK AREAS PT EVAL
Patient Education        Apendicectomía: [employer] en el hogar  Appendectomy: What to Expect at Home  Lewis recuperación    Lewis médico le extirpó el apéndice haciéndole varios owens pequeños, llamados incisiones, en el abdomen (Tenisha Avina) o con dominick Tammie Pulse. En la Tammie Pulse, el médico hace dominick incisión de [place]. Las incisiones marcos cicatrices que suelen volverse menos visibles con el [place]. Después de la [place], es normal sentirse débil y fatigado por varios días después de regresar al hogar. Es posible que tenga el abdomen hinchado y adolorido. Si le clancy hecho dominick cirugía laparoscópica, podría sentir dolor en el hombro pietro unas 24 horas. También podría tener [employer], diarrea, estreñimiento, gases o dolor de michael. Estos síntomas suelen desaparecer en pocos darshana. Lewis período de recuperación depende del tipo de cirugía que le hicieron. Si le hicieron dominick cirugía laparoscópica, es probable que pueda regresar al Floyce Dates o lewis rutina normal entre 1 y 3 semanas después de la [place]. Podría tardar de 2 a 4 semanas si le hicieron [place]. Si el apéndice se reventó, es posible que le hayan colocado un drenaje en la incisión. Lewis organismo funcionará normalmente sin el apéndice. No necesitará hacer ningún cambio en lewis alimentación ni en lewis estilo de anais. Esta hoja de [employer] idea general del tiempo que le llevará recuperarse. Sin embargo, cada persona se recupera a un ritmo diferente. Siga los pasos que se mencionan a continuación para recuperarse lo más rápido posible. ¿Cómo puede cuidarse en el hogar? Actividad    · Descanse cuando se sienta fatigado. Dormir lo suficiente le ayudará a recuperarse.     · Intente caminar todos los días. Comience caminando un poco más de lo que caminó el día anterior. Poco a poco, aumente la distancia.  Caminar aumenta el flujo de [place] y [place] a prevenir la neumonía y el estreñimiento.     · Pietro aproximadamente 2 semanas, no levante objetos que pudieran implicar un esfuerzo. Chestnut podría incluir un rachel, bolsas de las compras y envases de [place] pesados, mochilas o maletines pesados, bolsas de arena para excremento de deanna o alimentos para perros, o dominick aspiradora.     · Evite las actividades vigorosas, raz montar en bicicleta, trotar, levantar pesas o hacer ejercicios aeróbicos, hasta que el médico lo autorice.     · Es posible que pueda ducharse (a menos que tenga un drenaje cerca de la incisión) entre 24 y 50 horas después de la [place]. Seque la incisión con toques suaves de toalla. No tome amy de inmersión en [facility] las primeras 2 semanas o hasta que cobian médico lo apruebe. Si tiene un drenaje cerca de la incisión, siga las indicaciones de cobian médico.     · Puede conducir cuando ya no esté tomando analgésicos (medicamentos para el dolor) y pueda desplazar con rapidez cobian pie del acelerador al freno. También debe estar en condiciones de poder permanecer sentado con comodidad pietro un tiempo florencio, aun si no piensa ir muy lejos. Podría quedar atascado en el tráfico.     · Es probable que pueda volver a cobian trabajo al cabo de 1 a 3 semanas. Si le hicieron [place], es posible que tarde entre 3 y 4 semanas.     · Cobian médico le indicará cuándo puede volver a tener relaciones sexuales. Alimentación    · Puede continuar con cobain alimentación normal. Si tiene malestar estomacal, coma alimentos suaves bajos en grasa, raz arroz sin condimentar, matilda a la dang, pan sandro y yogur.     · Lucina abundantes líquidos (a menos que cobian médico le indique lo contrario).     · Podría notar que no evacua el intestino con regularidad delmi después de la cirugía. Chestnut es común. Trate de evitar el estreñimiento y de no hacer esfuerzos cuando evacua el intestino. Sería conveniente jaden un suplemento de BeDo.  Si no ha evacuado el intestino después de un par de días, pregúntele a cobian médico si puede jaden un laxante suave. Medicamentos    · Cobian médico le dirá si puede volver a jaden bret medicamentos y cuándo puede volver a hacerlo. También le dará indicaciones sobre cualquier medicamento nuevo que deba jaden usted.     · Si annette aspirina o cualquier otro medicamento que previene los coágulos de [place], pregúntele a cobian médico si debería volver a tomarlo y en qué momento. Asegúrese de entender exactamente lo que cobian médico quiere que rosaura.     · Si el apéndice se reventó, tendrá que jaden antibióticos. Tómelos según las indicaciones. No deje de tomarlos por el hecho de sentirse mejor. Debe jaden todos los antibióticos hasta terminarlos.     · Sea belkis con los medicamentos. [place] los analgésicos (medicamentos para el dolor) exactamente según las indicaciones. ? Si el médico le recetó un analgésico, tómelo según las indicaciones. ? Si no está tomando un analgésico recetado, tome nela de venta winston, marilee raz acetaminofén (Tylenol), ibuprofeno (Advil, Motrin) o naproxeno (Aleve). Camila y siga todas las instrucciones de la [place]. ? No tome dos o más analgésicos al mismo tiempo a menos que el médico se lo haya indicado. Muchos analgésicos contienen acetaminofén, es decir, Tylenol. El exceso de Tylenol puede ser dañino.     · Si le parece que el analgésico le está produciendo malestar estomacal:  ? [place] el analgésico después de las comidas (a menos que cobian médico le haya indicado lo contrario). ? Pídale al médico un analgésico diferente. Cuidado de la incisión    · Si le hicieron dominick cirugía abierta, podría tener grapas en la incisión. El médico se las quitará al cabo de 7 a 10 darshana.     · Si tiene tiras de cinta adhesiva sobre la incisión, déjeselas puestas pietro dominick semana o hasta que se caigan por sí solas.     · Puede lavarse la danitza con agua jabonosa tibia entre 24 y 50 horas después de la [facility], a menos que cobian médico le indique no [place].  Seque la danitza con toques suaves de toalla.     · Mantenga la danitza limpia y [place]. Puede cubrirla con dominick venda de gasa si supura o roza contra la ropa. Cambie la venda todos los días.     · Si el apéndice se reventó, podría tener un apósito en la incisión. Cambie el apósito con la frecuencia que le indique cobian médico.  ? Cambiar los apósitos puede doler al principio. Rayne un analgésico aproximadamente media hora antes de cambiarlo puede ayudar. ? Si el apósito se le pega a la herida, trate de empapar la danitza con agua tibia pietro unos 10 minutos antes de retirarlo. Puede hacerlo en la ducha o colocando dominick toallita mojada sobre el apósito. ? Retire el apósito anterior y lave la incisión con agua. Seque la superficie con toques suaves de toalla. ? El tamaño de la incisión determina la cantidad de gasa que necesitará colocar en matthew. Doble la gasa dominick vez, chio no la enrolle demasiado para que no le cause dolor. Colóquela con cuidado en la herida. Debe evitar que los bordes de la herida se toquen. Un hisopo de algodón puede ayudarle a empujar la gasa, según sea necesario. ? Coloque dominick almohadilla de gasa sobre la herida, y [place]. ? Es posible que note un líquido natalia verdoso que sale de la herida a medida que [place] a sanar. [place] es normal. Es un signo de que la herida está sanando. La atención de seguimiento es dominick parte clave de cobian tratamiento y seguridad. Asegúrese de hacer y acudir a todas las citas, y llame a cobian médico si está teniendo problemas. También es dominick buena idea saber los resultados de bret exámenes y mantener dominick lista de los medicamentos que annette. ¿Cuándo debe pedir ayuda? Llame al 911 en cualquier momento que considere que necesita atención de [place]. Por ejemplo, llame si:    · Se desmayó (perdió el conocimiento).     · Tiene dolor repentino en el pecho y falta de [place], o tose gracie.     · [address] serias dificultades para respirar.    Llame a cobian médico ahora mismo o busque atención médica inmediata si:    · Siente el estómago revuelto y no puede beber líquidos.     · Tiene diarrea intensa.     · Tiene dolor que no mejora después de jaden analgésicos.     · Tiene señales de infección, tales raz:  ? Aumento del dolor, la hinchazón, el enrojecimiento o la temperatura. ? Vetas rojizas que salen de la herida. ? Pus que supura de la herida. ? Maggie Iba.     · Tiene puntos de sutura flojos o se abre la incisión.     · La venda kenny está empapada con gracie de color perez vivo.     · Tiene señales de un coágulo de gracie, tales raz:  ? Dolor en la pantorrilla, el muslo, la ramos o detrás de la rodilla. ? Enrojecimiento e hinchazón en la pierna o la ramos. Preste especial atención a cualquier cambio en cobian colin y asegúrese de comunicarse con cobian médico si:    · Le clancy hecho dominick [place] y el dolor en el hombro dura más de 24 horas.     · Tiene fugas alrededor del drenaje o no sale nuevo líquido del drenaje pietro 24 horas.     · La cantidad de drenaje aumenta en forma repentina, o el color y la textura [place].     · No evacua el intestino después de jaden un laxante. ¿Dónde puede encontrar más información en inglés? Vaya a http://www.gray.com/  Flora [id number] en la búsqueda para aprender Mariella Tayo de \"Apendicectomía: Qué esperar en el hogar. \"  Revisado: 10 febrero, 2021               Versión del contenido: 13.0  © 9030-4491 Healthwise, Incorporated. Las instrucciones de cuidado fueron adaptadas bajo licencia por [employer] (which disclaims liability or warranty for this information). Si usted tiene [place] afección médica o sobre estas instrucciones, siempre pregunte a cobian profesional de colin. [employer] niega toda garantía o responsabilidad por cobian uso de esta información.               DISCHARGE SUMMARY from your Nurse      PATIENT INSTRUCTIONS    After general anesthesia or intravenous sedation, for 24 hours or while taking prescription Narcotics:  · Limit your activities  · Do not drive and operate hazardous machinery  · Do not make important personal or business decisions  · Do  not drink alcoholic beverages  · If you have not urinated within 8 hours after discharge, please contact your surgeon on call. Report the following to your surgeon:  · Excessive pain, swelling, redness or odor of or around the surgical area  · Temperature over 100.5  · Nausea and vomiting lasting longer than 4 hours or if unable to take medications  · Any signs of decreased circulation or nerve impairment to extremity: change in color, persistent  numbness, tingling, coldness or increase pain  · Any questions      GOOD HELP TO FIGHT AN INFECTION  Here are a few tip to help reduce the chance of getting an infection after surgery:   Wash Your Hands   Good handwashing is the most important thing you and your caregiver can do.  Wash before and after caring for any wounds. Dry your hand with a clean towel.  Wash with soap and water for at least 20 seconds. A TIP: sing the \"Happy Birthday\" song through one time while washing to help with the timing.  Use a hand  in between washings.  Shower   When your surgeon says it is OK to take a shower, use a new bar of antibacterial soap (if that is what you use, and keep that bar of soap ONLY for your use), or antibacterial body wash.  Use a clean wash cloth or sponge when you bathe.  Dry off with a clean towel  after every bath - be careful around any wounds, skin staples, sutures or surgical glue over/on wounds.  Do not enter swimming pools, hot tubs, lakes, rivers and/or ocean until wounds are healed and your doctor/surgeon says it is OK.  Use Clean Sheets   Sleep on freshly laundered sheets after your surgery.  Keep the surgery site covered with a clean, dry bandage (if instructed to do so). If the bandage becomes soiled, reapply a new, dry, clean bandage.     Do not allow pets to sleep with you while your wound is healing.  Lifestyle Modification and Controlling Your Blood Sugar   Smoking slows wound healing. Stop smoking and limit exposure to second-hand smoke.  High blood sugar slows wound healing. Eat a well-balanced diet to provide proper nutrition while healing   Monitor your blood sugar (if you are a diabetic) and take your medications as you are suppose to so you can control you blood sugar after surgery. COUGH AND DEEP BREATHE    Breathing deeply and coughing are very important exercises to do after surgery. Deep breathing and coughing open the little air tubes and air sacks in your lungs. You take deep breaths every day. You may not even notice - it is just something you do when you sigh or yawn. It is a natural exercise you do to keep these air passages open. After surgery, take deep breaths and cough, on purpose. DIRECTIONS:  · Take 10 to 15 slow deep breaths every hour while awake. · Breathe in deeply, and hold it for 2 seconds. · Exhale slowly through puckered lips, like blowing up a balloon. · After every 4th or 5th deep breath, hug your pillow to your chest or belly and give a hard, deep cough. Yes, it will probably hurt. But doing this exercise is a very important part of healing after surgery. Take your pain medicine to help you do this exercise without too much pain. Coughing and deep breathing help prevent bronchitis and pneumonia after surgery. If you had chest or belly surgery, use a pillow as a \"hug bella\" and hold it tightly to your chest or belly when you cough. ANKLE PUMPS    Ankle pumps increase the circulation of oxygenated blood to your lower extremities and decrease your risk for circulation problems such as blood clots. They also stretch the muscles, tendons and ligaments in your foot and ankle, and prevent joint contracture in the ankle and foot, especially after surgeries on the legs.     It is important to do ankle pump exercises regularly after surgery because immobility increases your risk for developing a blood clot. Your doctor may also have you take an Aspirin for the next few days as well. If your doctor did not ask you to take an Aspirin, consult with him before starting Aspirin therapy on your own. The exercise is quite simple. · Slowly point your foot forward, feeling the muscles on the top of your lower leg stretch, and hold this position for 5 seconds. · Next, pull your foot back toward you as far as possible, stretching the calf muscles, and hold that position for 5 seconds. · Repeat with the other foot. · Perform 10 repetitions every hour while awake for both ankles if possible (down and then up with the foot once is one repetition). You should feel gentle stretching of the muscles in your lower leg when doing this exercise. If you feel pain, or your range of motion is limited, don't push too hard. Only go the limit your joint and muscles will let you go. If you have increasing pain, progressively worsening leg warmth or swelling, STOP the exercise and call your doctor. MEDICATION AND   SIDE EFFECT GUIDE    The [facility] MEDICATION AND SIDE EFFECT GUIDE was provided to the PATIENT AND CARE PROVIDER. Information provided includes instruction about drug purpose and common side effects for the following medications:    oxyCODONE IR (ROXICODONE)         These are general instructions for a healthy lifestyle:    *   Please give a list of your current medications to your Primary Care Provider. *   Please update this list whenever your medications are discontinued, doses are changed, or new medications (including over-the-counter products) are added. *   Please carry medication information at all times in case of emergency situations.       About Smoking  No smoking / No tobacco products  Avoid exposure to second hand smoke     Surgeon General's Warning:  Quitting smoking now greatly reduces serious risk to your health. Obesity, smoking, and sedentary lifestyle greatly increases your risk for illness and disease. A healthy diet, regular physical exercise & weight monitoring are important for maintaining a healthy lifestyle. Congestive Heart Failure  You may be retaining fluid if you have a history of heart failure or if you experience any of the following symptoms:  Weight gain of 3 pounds or more overnight or 5 pounds in a week, increased swelling in your hands or feet or shortness of breath while lying flat in bed. Please call your doctor as soon as you notice any of these symptoms; do not wait until your next office visit. Recognize signs and symptoms of STROKE:  F -  Face looks uneven  A -  Arms unable to move or move evenly  S -  Speech slurred or non-existent  T -  Time-call 911 as soon as signs and symptoms begin-DO NOT go          back to bed or wait to see if you get better-TIME IS BRAIN. Warning Signs of HEART ATTACK   Call 911 if you have these symptoms:     Chest discomfort. Most heart attacks involve discomfort in the center of the chest that lasts more than a few minutes, or that goes away and comes back. It can feel like uncomfortable pressure, squeezing, fullness, or pain.  Discomfort in other areas of the upper body. Symptoms can include pain or discomfort in one or both arms, the back, neck, jaw, or stomach.  Shortness of breath with or without chest discomfort.  Other signs may include breaking out in a cold sweat, nausea, or lightheadedness. Don't wait more than five minutes to call 911 - MINUTES MATTER! Fast action can save your life. Calling 911 is almost always the fastest way to get lifesaving treatment. Emergency Medical Services staff can begin treatment when they arrive -- up to an hour sooner than if someone gets to the hospital by car.        Learning About Coronavirus [phone])  Coronavirus [phone]): Overview  What is coronavirus (UUSSB-51)? The coronavirus disease (COVID-19) is caused by a virus. It is an illness that was first found in [place], [place], in December 2019. It has since spread worldwide. The virus can cause fever, cough, and trouble breathing. In severe cases, it can cause pneumonia and make it hard to breathe without help. It can cause death. Coronaviruses are a large group of viruses. They cause the common cold. They also cause more serious illnesses like Middle East respiratory syndrome (MERS) and severe acute respiratory syndrome (SARS). COVID-19 is caused by a novel coronavirus. That means it's a new type that has not been seen in people before. This virus spreads person-to-person through droplets from coughing and sneezing. It can also spread when you are close to someone who is infected. And it can spread when you touch something that has the virus on it, such as a doorknob or a tabletop. What can you do to protect yourself from coronavirus (COVID-19)? The best way to protect yourself from getting sick is to:  · Avoid areas where there is an outbreak. · Avoid contact with people who may be infected. · Wash your hands often with soap or alcohol-based hand sanitizers. · Avoid crowds and try to stay at least 6 feet away from other people. · Wash your hands often, especially after you cough or sneeze. Use soap and water, and scrub for at least 20 seconds. If soap and water aren't available, use an alcohol-based hand . · Avoid touching your mouth, nose, and eyes. What can you do to avoid spreading the virus to others? To help avoid spreading the virus to others:  · Cover your mouth with a tissue when you cough or sneeze. Then throw the tissue in the trash. · Use a disinfectant to clean things that you touch often. · Stay home if you are sick or have been exposed to the virus. Don't go to school, work, or public areas. And don't use public transportation.   · If you are sick:  ? Leave your home only if you need to get medical care. But call the doctor's office first so they know you're coming. And wear a face mask, if you have one.  ? If you have a face mask, wear it whenever you're around other people. It can help stop the spread of the virus when you cough or sneeze. ? Clean and disinfect your home every day. Use household  and disinfectant wipes or sprays. Take special care to clean things that you grab with your hands. These include doorknobs, remote controls, phones, and handles on your refrigerator and microwave. And don't forget countertops, tabletops, bathrooms, and computer keyboards. When to call for help  Call 911 anytime you think you may need emergency care. For example, call if:  · You have severe trouble breathing. (You can't talk at all.)  · You have constant chest pain or pressure. · You are severely dizzy or lightheaded. · You are confused or can't think clearly. · Your face and lips have a blue color. · You pass out (lose consciousness) or are very hard to wake up. Call your doctor now if you develop symptoms such as:  · Shortness of breath. · Fever. · Cough. If you need to get care, call ahead to the doctor's office for instructions before you go. Make sure you wear a face mask, if you have one, to prevent exposing other people to the virus. Where can you get the latest information? The following health organizations are tracking and studying this virus. Their websites contain the most up-to-date information. Meron Daniels also learn what to do if you think you may have been exposed to the virus. · U.S. Centers for Disease Control and Prevention (CDC): The CDC provides updated news about the disease and travel advice. The website also tells you how to prevent the spread of infection. www.cdc.gov  · World Health Organization [facility]): WHO offers information about the virus outbreaks.  WHO also has travel advice. www.who.int  Current as of: April 1, 2020               Content Version: 12.4  © 4194-2411 Healthwise, Incorporated. Care instructions adapted under license by your healthcare professional. If you have questions about a medical condition or this instruction, always ask your healthcare professional. [address] any warranty or liability for your use of this information. The discharge information has been reviewed with the patient and spouse. Any questions and concerns from the patient and spouse have been addressed. The patient and spouse verbalized understanding. CONTENTS FOUND IN YOUR DISCHARGE ENVELOPE:  [x]     Surgeon and Hospital Discharge Instructions  [x]     [facility] Surgical Services Care Provider Card  []     Medication & Side Effect Guide            (your newly prescribed medications have been marked/highlighted showing the most common side effects from   the classes of drugs on your prescriptions)   []     Medication Prescription(s) x oxyCODONE IR (ROXICODONE)        ( [] These have been sent electronically to your pharmacy by your surgeon,   - OR -       your surgeon has already provided these to you during a previous/pre-op office visit)  []     [address]  []     Physical Therapy Prescription  []     Follow-up Appointment Cards  []     Surgery-related Pictures/Media  []     Pain block and/or block with On-Q Catheter from Anesthesia Service (information included in your instructions above)  []     Medical device information sheets/pamphlets from their    []     School/work excuse note. []     /parent work excuse note.       The following personal items collected during your admission are returned to you:   Dental Appliance: Dental Appliances: None  Vision:    Hearing Aid:    Jewelry:    Clothing:    Other Valuables:    Valuables sent to safe: fall

## 2024-10-03 NOTE — PROCEDURE NOTE - GENERAL PROCEDURE DETAILS
[Normocephalic] : normocephalic [Atraumatic] : atraumatic [Supple] : supple [No Supraclavicular Adenopathy] : no supraclavicular adenopathy [Examined in the supine and seated position] : examined in the supine and seated position [Symmetrical] : symmetrical [No dominant masses] : no dominant masses in right breast  silvia cleansed with chloro prep, suture removed, drain removed. steri strip applied. [No dominant masses] : no dominant masses left breast [No Nipple Retraction] : no left nipple retraction [No Nipple Discharge] : no left nipple discharge [No Axillary Lymphadenopathy] : no left axillary lymphadenopathy [No Edema] : no edema [No Rashes] : no rashes [No Ulceration] : no ulceration [de-identified] : FGC's uoq bilaterally

## 2024-10-29 PROBLEM — M54.50 CHRONIC LOW BACK PAIN: Status: ACTIVE | Noted: 2019-09-04

## 2024-10-29 PROBLEM — M48.061 SPINAL STENOSIS, LUMBAR: Status: ACTIVE | Noted: 2024-10-29

## 2024-10-29 PROBLEM — I77.819 DILATION OF DESCENDING AORTA: Status: RESOLVED | Noted: 2024-10-29 | Resolved: 2024-10-29

## 2024-10-29 RX ORDER — ATORVASTATIN CALCIUM 80 MG/1
TABLET, FILM COATED ORAL
Refills: 0 | Status: ACTIVE | COMMUNITY

## 2024-10-29 RX ORDER — OXYCODONE HYDROCHLORIDE AND ACETAMINOPHEN 10; 325 MG/1; MG/1
10-325 TABLET ORAL
Refills: 0 | Status: ACTIVE | COMMUNITY

## 2024-10-29 RX ORDER — GABAPENTIN 600 MG/1
600 TABLET, COATED ORAL
Refills: 0 | Status: ACTIVE | COMMUNITY

## 2024-10-29 RX ORDER — FENOFIBRATE 150 MG/1
CAPSULE ORAL
Refills: 0 | Status: ACTIVE | COMMUNITY

## 2024-10-29 RX ORDER — ASPIRIN 81 MG
81 TABLET,CHEWABLE ORAL
Refills: 0 | Status: ACTIVE | COMMUNITY

## 2024-10-31 ENCOUNTER — APPOINTMENT (OUTPATIENT)
Dept: NEUROSURGERY | Facility: CLINIC | Age: 69
End: 2024-10-31
Payer: MEDICARE

## 2024-10-31 ENCOUNTER — OUTPATIENT (OUTPATIENT)
Dept: OUTPATIENT SERVICES | Facility: HOSPITAL | Age: 69
LOS: 1 days | End: 2024-10-31
Payer: MEDICARE

## 2024-10-31 VITALS
HEART RATE: 103 BPM | DIASTOLIC BLOOD PRESSURE: 86 MMHG | BODY MASS INDEX: 37.56 KG/M2 | HEIGHT: 64 IN | RESPIRATION RATE: 18 BRPM | SYSTOLIC BLOOD PRESSURE: 126 MMHG | WEIGHT: 220 LBS | TEMPERATURE: 97.6 F | OXYGEN SATURATION: 93 %

## 2024-10-31 DIAGNOSIS — Z78.9 OTHER SPECIFIED HEALTH STATUS: ICD-10-CM

## 2024-10-31 DIAGNOSIS — M54.50 LOW BACK PAIN, UNSPECIFIED: ICD-10-CM

## 2024-10-31 DIAGNOSIS — F17.200 NICOTINE DEPENDENCE, UNSPECIFIED, UNCOMPLICATED: ICD-10-CM

## 2024-10-31 DIAGNOSIS — Z96.641 PRESENCE OF RIGHT ARTIFICIAL HIP JOINT: Chronic | ICD-10-CM

## 2024-10-31 DIAGNOSIS — I77.819 AORTIC ECTASIA, UNSPECIFIED SITE: ICD-10-CM

## 2024-10-31 DIAGNOSIS — R10.2 PELVIC AND PERINEAL PAIN: ICD-10-CM

## 2024-10-31 DIAGNOSIS — M48.061 SPINAL STENOSIS, LUMBAR REGION WITHOUT NEUROGENIC CLAUDICATION: ICD-10-CM

## 2024-10-31 DIAGNOSIS — Z41.9 ENCOUNTER FOR PROCEDURE FOR PURPOSES OTHER THAN REMEDYING HEALTH STATE, UNSPECIFIED: Chronic | ICD-10-CM

## 2024-10-31 DIAGNOSIS — M43.10 SPONDYLOLISTHESIS, SITE UNSPECIFIED: ICD-10-CM

## 2024-10-31 DIAGNOSIS — G89.29 LOW BACK PAIN, UNSPECIFIED: ICD-10-CM

## 2024-10-31 DIAGNOSIS — F32.A DEPRESSION, UNSPECIFIED: ICD-10-CM

## 2024-10-31 PROCEDURE — 72110 X-RAY EXAM L-2 SPINE 4/>VWS: CPT | Mod: 26

## 2024-10-31 PROCEDURE — 99204 OFFICE O/P NEW MOD 45 MIN: CPT

## 2024-10-31 PROCEDURE — 72110 X-RAY EXAM L-2 SPINE 4/>VWS: CPT

## 2024-10-31 RX ORDER — FLUTICASONE FUROATE, UMECLIDINIUM BROMIDE AND VILANTEROL TRIFENATATE 100; 62.5; 25 UG/1; UG/1; UG/1
100-62.5-25 POWDER RESPIRATORY (INHALATION)
Refills: 0 | Status: ACTIVE | COMMUNITY